# Patient Record
Sex: FEMALE | Race: WHITE | NOT HISPANIC OR LATINO | Employment: OTHER | URBAN - METROPOLITAN AREA
[De-identification: names, ages, dates, MRNs, and addresses within clinical notes are randomized per-mention and may not be internally consistent; named-entity substitution may affect disease eponyms.]

---

## 2017-02-10 ENCOUNTER — TRANSFERRED RECORDS (OUTPATIENT)
Dept: HEALTH INFORMATION MANAGEMENT | Facility: CLINIC | Age: 67
End: 2017-02-10

## 2017-02-12 DIAGNOSIS — N95.1 SYMPTOMATIC MENOPAUSAL OR FEMALE CLIMACTERIC STATES: Primary | ICD-10-CM

## 2017-02-14 ENCOUNTER — TRANSFERRED RECORDS (OUTPATIENT)
Dept: HEALTH INFORMATION MANAGEMENT | Facility: CLINIC | Age: 67
End: 2017-02-14

## 2017-02-15 ENCOUNTER — HOSPITAL ENCOUNTER (OUTPATIENT)
Dept: GENERAL RADIOLOGY | Facility: HOSPITAL | Age: 67
Discharge: HOME | End: 2017-02-15
Attending: NURSE PRACTITIONER | Admitting: NURSE PRACTITIONER
Payer: COMMERCIAL

## 2017-02-15 PROCEDURE — 77080 DXA BONE DENSITY AXIAL: CPT | Mod: TC

## 2018-02-13 ENCOUNTER — TRANSFERRED RECORDS (OUTPATIENT)
Dept: HEALTH INFORMATION MANAGEMENT | Facility: CLINIC | Age: 68
End: 2018-02-13

## 2018-02-13 LAB
CREAT SERPL-MCNC: 0.79 MG/DL (ref 0.4–1)
GFR SERPL CREATININE-BSD FRML MDRD: >60 ML/MIN/1.73M2
GLUCOSE SERPL-MCNC: 100 MG/DL (ref 70–100)
POTASSIUM SERPL-SCNC: 4.1 MEQ/L (ref 3.4–5.1)
TSH SERPL-ACNC: 3.67 UIU/ML (ref 0.4–3.99)

## 2019-04-24 NOTE — TELEPHONE ENCOUNTER
Synthroid  Last Written Prescription Date: Patient Reported  Last Fill Quantity: NA # of Refills: NA  Last Office Visit: 2/14/17    Atenolol  Last Written Prescription Date: Patient Reported  Last Fill Quantity: NA # of Refills: NA  Last Office Visit: 2/14/17    Trazodone  Last Written Prescription Date: Patient Reported  Last Fill Quantity: NA # of Refills: NA  Last Office Visit: 2/14/17    Protonix  Last Written Prescription Date: Patient Reported  Last Fill Quantity: NA # of Refills: NA  Last Office Visit: 2/14/17

## 2019-04-25 RX ORDER — LEVOTHYROXINE SODIUM 112 UG/1
112 TABLET ORAL DAILY
OUTPATIENT
Start: 2019-04-25

## 2019-04-25 RX ORDER — ATENOLOL 25 MG/1
25 TABLET ORAL DAILY
OUTPATIENT
Start: 2019-04-25

## 2019-04-25 RX ORDER — TRAZODONE HYDROCHLORIDE 50 MG/1
50 TABLET, FILM COATED ORAL AT BEDTIME
OUTPATIENT
Start: 2019-04-25

## 2019-04-25 RX ORDER — PANTOPRAZOLE SODIUM 40 MG/1
40 TABLET, DELAYED RELEASE ORAL
OUTPATIENT
Start: 2019-04-25

## 2019-05-01 RX ORDER — LEVOTHYROXINE SODIUM 112 UG/1
112 TABLET ORAL DAILY
OUTPATIENT
Start: 2019-05-01

## 2019-05-01 RX ORDER — PANTOPRAZOLE SODIUM 40 MG/1
40 TABLET, DELAYED RELEASE ORAL
OUTPATIENT
Start: 2019-05-01

## 2019-05-01 RX ORDER — ATENOLOL 25 MG/1
25 TABLET ORAL DAILY
OUTPATIENT
Start: 2019-05-01

## 2019-05-01 NOTE — TELEPHONE ENCOUNTER
Pantoprazole      Last Written Prescription Date:  Historical  Last Fill Quantity: 0,   # refills: 0  Last Office Visit: 2/14/17  Future Office visit:    Next 5 appointments (look out 90 days)    May 06, 2019  7:40 AM CDT  (Arrive by 7:25 AM)  Office Visit with Tiny Issa NP  [facility] ([facility] ) [address]  [place] [address]  [phone]           Routing refill request to provider for review/approval because:    Atenolol      Last Written Prescription Date:  Historical  Last Fill Quantity: 0,   # refills: 0  Last Office Visit: 2/14/17  Future Office visit:    Next 5 appointments (look out 90 days)    May 06, 2019  7:40 AM CDT  (Arrive by 7:25 AM)  Office Visit with Tiny Issa NP  [facility] ([facility] ) [address] [facility] [address]  [phone]           Routing refill request to provider for review/approval because:    Levothyroxine      Last Written Prescription Date:  Historical  Last Fill Quantity: 0,   # refills: 0  Last Office Visit: 2/14 /17  Future Office visit:    Next 5 appointments (look out 90 days)    May 06, 2019  7:40 AM CDT  (Arrive by 7:25 AM)  Office Visit with Tiny Issa NP  [facility] ([facility] ) [address]  [place] [place] [address]  [phone]           Routing refill request to provider for review/approval because:

## 2019-05-02 DIAGNOSIS — F51.01 PRIMARY INSOMNIA: Primary | ICD-10-CM

## 2019-05-02 NOTE — TELEPHONE ENCOUNTER
Amitriptyline      Last Written Prescription Date:  Historical  Last Fill Quantity: 0,   # refills: 0  Last Office Visit: 2/14/17  Future Office visit:    Next 5 appointments (look out 90 days)    May 06, 2019  7:40 AM CDT  (Arrive by 7:25 AM)  Office Visit with Tiny Issa NP  [facility] ([facility] ) [address]  [place] [address]  [phone]           Routing refill request to provider for review/approval because:

## 2019-05-02 NOTE — TELEPHONE ENCOUNTER
Are you willing to sign for medication since patient has appointment next week? Please advise.      Last visit: 2.10.17

## 2019-05-03 PROBLEM — Z78.0 POSTMENOPAUSAL STATUS: Status: ACTIVE | Noted: 2017-02-10

## 2019-05-03 PROBLEM — M85.80 OSTEOPENIA: Status: ACTIVE | Noted: 2017-02-10

## 2019-05-03 NOTE — PROGRESS NOTES
SUBJECTIVE:   Aide Galarza is a 68 year old female who presents for Preventive Visit.    Are you in the first 12 months of your Medicare coverage?  No    HPI  Do you feel safe in your environment? Yes    Do you have a Health Care Directive? Yes: Patient states has Advance Directive and will bring in a copy to clinic.      Fall risk  Fallen 2 or more times in the past year?: No  Any fall with injury in the past year?: No    Cognitive Screening   1) Repeat 3 items (Leader, Season, Table)    2) Clock draw: NORMAL  3) 3 item recall: Recalls 3 objects  Results: all correct    Mini-CogTM Copyright S Rosas. Licensed by the author for use in [employer]; reprinted with permission (merritt@[facility]). All rights reserved.      Do you have sleep apnea, excessive snoring or daytime drowsiness?: no     Functional dyspepsia. Switched to protonix with minimal relief and then amitriptyline was added and she had complete improvement. Would like to continue both. Consumes a healthy diet and notes that she is trying to increase her protein intake.       Hypothyroidism: Mild constipation, controlled. Otherwise denies skin/hair/nail changes, temperature intolerance, weight changes or heart palpitations. Taking Synthroid. TSH Due.      HTN: Not checking BP at home. Denies CP, palpitations, SOB, edema, vision changes, headaches. Currently taking atenolol. Denies any side effects. Watches sodium intake. Exercises daily.     Insomnia: Controlled with trazodone.     Reviewed and updated as needed this visit by clinical staff  Tobacco  Allergies  Meds  Med Hx  Surg Hx  Fam Hx  Soc Hx        Reviewed and updated as needed this visit by Provider        Social History     Tobacco Use     Smoking status: Never Smoker     Smokeless tobacco: Never Used   Substance Use Topics     Alcohol use: Yes     Comment: daily- wine      If you drink alcohol do you typically have >3 drinks per day or >7 drinks per week? no    Current providers  sharing in care for this patient include:   Patient Care Team:  Tiny Issa, NP as PCP - General    The following health maintenance items are reviewed in Epic and correct as of today:  Health Maintenance   Topic Date Due     TSH Q1 YEAR  07/19/1951     BMP Q1 YR  07/19/1951     HEPATITIS C SCREENING  07/19/1970     MAMMO SCREEN Q2 YR (SYSTEM ASSIGNED)  07/19/1992     LIPID SCREEN Q5 YR FEMALE (SYSTEM ASSIGNED)  07/19/1997     ZOSTER IMMUNIZATION (2 of 3) 12/16/2015     MEDICARE ANNUAL WELLNESS VISIT  07/19/2017     PNEUMOCOCCAL IMMUNIZATION 65+ LOW/MEDIUM RISK (1 of 2 - PCV13) 07/19/2017     FALL RISK ASSESSMENT  05/06/2020     ADVANCE DIRECTIVE PLANNING Q5 YRS  05/06/2024     COLONOSCOPY Q10 YR  09/30/2026     DTAP/TDAP/TD IMMUNIZATION (4 - Td) 02/13/2028     DEXA SCAN SCREENING (SYSTEM ASSIGNED)  Completed     PHQ-2  Completed     INFLUENZA VACCINE  Completed     IPV IMMUNIZATION  Aged Out     MENINGITIS IMMUNIZATION  Aged Out     BP Readings from Last 3 Encounters:   05/06/19 118/84   09/30/16 116/74   09/02/16 126/78    Wt Readings from Last 3 Encounters:   05/06/19 69.9 kg (154 lb)   09/30/16 67 kg (147 lb 12.8 oz)   09/02/16 67.6 kg (149 lb)                  Patient Active Problem List   Diagnosis     Encounter for general adult medical examination without abnormal findings     Essential hypertension     Gastroesophageal reflux disease     Hypothyroidism     Insomnia     Osteopenia     Postmenopausal status     H/O colonoscopy     Past Surgical History:   Procedure Laterality Date     caraitd Left 2011    partial facial disection with malignant tumor- [facility]      COLONOSCOPY N/A 9/30/2016    Procedure: COLONOSCOPY;  Surgeon: Gaurav Santana DO;  Location: [facility]     COLONOSCOPY      normal- every 10 years- [facility]        Social History     Tobacco Use     Smoking status: Never Smoker     Smokeless tobacco: Never Used   Substance Use Topics     Alcohol use: Yes     Comment: daily-  wine      Family History   Problem Relation Age of Onset     Hypertension Mother      Parkinsonism Father      Hypertension Father      Hypothyroidism Sister      Hypothyroidism Sister      Hypothyroidism Sister      Hypothyroidism Sister          Current Outpatient Medications   Medication Sig Dispense Refill     amitriptyline (ELAVIL) 25 MG tablet Take 1 tablet (25 mg) by mouth At Bedtime 90 tablet 3     atenolol (TENORMIN) 25 MG tablet Take 1 tablet (25 mg) by mouth daily 90 tablet 3     Calcium-Vitamin D 600-200 MG-UNIT TABS Take 1 tablet by mouth daily       Levothyroxine Sodium (SYNTHROID PO) Take 112 mcg by mouth daily       MULTIPLE VITAMINS PO Take 1 tablet by mouth daily       pantoprazole (PROTONIX) 40 MG EC tablet Take 1 tablet (40 mg) by mouth every morning (before breakfast) 90 tablet 3     traZODone (DESYREL) 50 MG tablet Take 1 tablet (50 mg) by mouth At Bedtime 90 tablet 3     No Known Allergies  Pneumonia Vaccine:Adults age 65+ who received their first dose of Pneumovax (PPSV23) prior to age 65 years: Should be given PCV 13 > 1 year after their most recent PPSV23 AND should be given a another dose of PPSV23 > 5 years after their most recent dose of PPSV23    Mammogram Screening: Mammogram Screening: Patient over age 50, mutual decision to screen reflected in health maintenance. She had her mammogram last year and it was negative. Will repeat every 2 years-will therefore do mammogram next year. She has no family history of breast cancer.     History of abnormal Pap smear: NO - age 67 - see link Cervical Cytology Screening Guidelines      Menopause age 56. Is on premarin cream as needed for vaginal dryness. No vaginal bleeding. Most recent pap smear was 1/23/15 and was negative. No history of abnormal paps. Patient denies breast changes, masses or discharge. Current contraceptive plans: post menopausal status. Will complete pap next year.     Review of Systems  CONSTITUTIONAL: NEGATIVE for fever,  "chills, change in weight  INTEGUMENTARY/SKIN: NEGATIVE for worrisome rashes, moles or lesions  EYES: NEGATIVE for vision changes or irritation  ENT/MOUTH: NEGATIVE for ear, mouth and throat problems  RESP: NEGATIVE for significant cough or SOB  BREAST: NEGATIVE for masses, tenderness or discharge  CV: NEGATIVE for chest pain, palpitations or peripheral edema  GI: NEGATIVE for nausea, abdominal pain, heartburn, or change in bowel habits  : NEGATIVE for frequency, dysuria, or hematuria  MUSCULOSKELETAL: NEGATIVE for significant arthralgias or myalgia  NEURO: NEGATIVE for weakness, dizziness or paresthesias  ENDOCRINE: NEGATIVE for temperature intolerance, skin/hair changes  HEME: NEGATIVE for bleeding problems  PSYCHIATRIC: NEGATIVE for changes in mood or affect    OBJECTIVE:   /84   Pulse 67   Temp 97.9  F (36.6  C) (Tympanic)   Ht 1.651 m (5' 5\")   Wt 69.9 kg (154 lb)   LMP  (LMP Unknown)   SpO2 99%   BMI 25.63 kg/m   Estimated body mass index is 25.63 kg/m  as calculated from the following:    Height as of this encounter: 1.651 m (5' 5\").    Weight as of this encounter: 69.9 kg (154 lb).  Physical Exam  GENERAL: healthy, alert and no distress  EYES: Eyes grossly normal to inspection, PERRL and conjunctivae and sclerae normal  HENT: ear canals and TM's normal, nose and mouth without ulcers or lesions  NECK: no adenopathy, no asymmetry, masses, or scars and thyroid normal to palpation  RESP: lungs clear to auscultation - no rales, rhonchi or wheezes  CV: regular rate and rhythm, normal S1 S2, no S3 or S4, no murmur  ABDOMEN:  soft, nontender, no HSM or masses and bowel sounds normal  MS: extremities normal- no gross deformities noted, no evidence of inflammation in joints, FROM in all extremities.  Lower Legs-no edema    Diagnostic Test Results:  Collected and pending    ASSESSMENT / PLAN:   (Z13.220) Lipid screening  (primary encounter diagnosis)  Plan: Lipid Profile (Chol, Trig, HDL, LDL calc)        " "Will notify patient of the results when available and intervene accordingly.     (I10) Essential hypertension  Plan: Basic metabolic panel, atenolol (TENORMIN) 25 MG tablet        BMP pending. BP well controlled. Medication refilled. F/u yearly. Encouraged to continue exercising and limiting her sodium intake.     (Z00.00) Health maintenance examination  Plan: Will update pna 13 vaccine. Colonoscopy UTD. Will do mammogram next year.     (K21.9) Gastroesophageal reflux disease, esophagitis presence not specified  Comment: controlled  Plan: pantoprazole (PROTONIX) 40 MG EC tablet        Continue pantoprazole and amitriptyline. DXA scan pending. If osteopenia has worsened, may consider decreasing pantoprazole.     (Z78.0) Asymptomatic postmenopausal estrogen deficiency  Comment: patient has osteopenia  Plan: DX Hip/Pelvis/Spine        Will notify patient of the results when available and intervene accordingly.     (E03.9) Hypothyroidism, unspecified type  Plan: TSH with free T4 reflex        Will notify patient of the results when available and intervene accordingly.     (F51.01) Primary insomnia  Comment: controlled.   Plan: amitriptyline (ELAVIL) 25 MG tablet, traZODone         (DESYREL) 50 MG tablet        Continue current medications.     (Z23) Encounter for immunization  Plan: PNEUMOCOCCAL CONJ VACCINE 13 VALENT IM      End of Life Planning:  Patient currently has an advanced directive: Yes.  Practitioner is supportive of decision.    COUNSELING:  Reviewed preventive health counseling, as reflected in patient instructions       Regular exercise       Healthy diet/nutrition       Vision screening       Dental care       Osteoporosis Prevention/Bone Health    BP Readings from Last 1 Encounters:   05/06/19 118/84     Estimated body mass index is 25.63 kg/m  as calculated from the following:    Height as of this encounter: 1.651 m (5' 5\").    Weight as of this encounter: 69.9 kg (154 lb).           reports that she " has never smoked. She has never used smokeless tobacco.      Appropriate preventive services were discussed with this patient, including applicable screening as appropriate for cardiovascular disease, diabetes, osteopenia/osteoporosis, and glaucoma.  As appropriate for age/gender, discussed screening for colorectal cancer, prostate cancer, breast cancer, and cervical cancer. Checklist reviewing preventive services available has been given to the patient.    Reviewed patients plan of care and provided an AVS. The Basic Care Plan (routine screening as documented in Health Maintenance) for Aide meets the Care Plan requirement. This Care Plan has been established and reviewed with the Patient.    Counseling Resources:  ATP IV Guidelines  Pooled Cohorts Equation Calculator  Breast Cancer Risk Calculator  FRAX Risk Assessment  ICSI Preventive Guidelines  Dietary Guidelines for Americans, 2010  USDA's MyPlate  ASA Prophylaxis  Lung CA Screening    Tiny Issa NP  [facility] [place]    Identified Health Risks:

## 2019-05-06 ENCOUNTER — OFFICE VISIT (OUTPATIENT)
Dept: FAMILY MEDICINE | Facility: OTHER | Age: 69
End: 2019-05-06
Attending: NURSE PRACTITIONER
Payer: COMMERCIAL

## 2019-05-06 VITALS
HEART RATE: 67 BPM | TEMPERATURE: 97.9 F | BODY MASS INDEX: 25.66 KG/M2 | OXYGEN SATURATION: 99 % | SYSTOLIC BLOOD PRESSURE: 118 MMHG | WEIGHT: 154 LBS | HEIGHT: 65 IN | DIASTOLIC BLOOD PRESSURE: 84 MMHG

## 2019-05-06 DIAGNOSIS — E03.9 HYPOTHYROIDISM, UNSPECIFIED TYPE: Primary | ICD-10-CM

## 2019-05-06 DIAGNOSIS — K21.9 GASTROESOPHAGEAL REFLUX DISEASE, ESOPHAGITIS PRESENCE NOT SPECIFIED: ICD-10-CM

## 2019-05-06 DIAGNOSIS — Z13.220 LIPID SCREENING: Primary | ICD-10-CM

## 2019-05-06 DIAGNOSIS — Z78.0 ASYMPTOMATIC POSTMENOPAUSAL ESTROGEN DEFICIENCY: ICD-10-CM

## 2019-05-06 DIAGNOSIS — I10 ESSENTIAL HYPERTENSION: ICD-10-CM

## 2019-05-06 DIAGNOSIS — E03.9 HYPOTHYROIDISM, UNSPECIFIED TYPE: ICD-10-CM

## 2019-05-06 DIAGNOSIS — Z00.00 HEALTH MAINTENANCE EXAMINATION: ICD-10-CM

## 2019-05-06 DIAGNOSIS — F51.01 PRIMARY INSOMNIA: ICD-10-CM

## 2019-05-06 DIAGNOSIS — Z23 ENCOUNTER FOR IMMUNIZATION: ICD-10-CM

## 2019-05-06 PROBLEM — Z91.89 FRAMINGHAM CARDIAC RISK <10% IN NEXT 10 YEARS: Status: ACTIVE | Noted: 2019-05-06

## 2019-05-06 PROBLEM — Z98.890 H/O COLONOSCOPY: Status: ACTIVE | Noted: 2019-05-06

## 2019-05-06 LAB
ANION GAP SERPL CALCULATED.3IONS-SCNC: 3 MMOL/L (ref 3–14)
BUN SERPL-MCNC: 16 MG/DL (ref 7–30)
CALCIUM SERPL-MCNC: 9.2 MG/DL (ref 8.5–10.1)
CHLORIDE SERPL-SCNC: 106 MMOL/L (ref 94–109)
CHOLEST SERPL-MCNC: 204 MG/DL
CO2 SERPL-SCNC: 30 MMOL/L (ref 20–32)
CREAT SERPL-MCNC: 0.72 MG/DL (ref 0.52–1.04)
GFR SERPL CREATININE-BSD FRML MDRD: 86 ML/MIN/{1.73_M2}
GLUCOSE SERPL-MCNC: 95 MG/DL (ref 70–99)
HDLC SERPL-MCNC: 73 MG/DL
LDLC SERPL CALC-MCNC: 115 MG/DL
NONHDLC SERPL-MCNC: 131 MG/DL
POTASSIUM SERPL-SCNC: 4.2 MMOL/L (ref 3.4–5.3)
SODIUM SERPL-SCNC: 139 MMOL/L (ref 133–144)
TRIGL SERPL-MCNC: 80 MG/DL
TSH SERPL DL<=0.005 MIU/L-ACNC: 1.02 MU/L (ref 0.4–4)

## 2019-05-06 PROCEDURE — 84443 ASSAY THYROID STIM HORMONE: CPT | Mod: ZL | Performed by: NURSE PRACTITIONER

## 2019-05-06 PROCEDURE — 80061 LIPID PANEL: CPT | Mod: ZL | Performed by: NURSE PRACTITIONER

## 2019-05-06 PROCEDURE — G0009 ADMIN PNEUMOCOCCAL VACCINE: HCPCS | Performed by: NURSE PRACTITIONER

## 2019-05-06 PROCEDURE — 90670 PCV13 VACCINE IM: CPT

## 2019-05-06 PROCEDURE — 36415 COLL VENOUS BLD VENIPUNCTURE: CPT | Mod: ZL | Performed by: NURSE PRACTITIONER

## 2019-05-06 PROCEDURE — G0463 HOSPITAL OUTPT CLINIC VISIT: HCPCS | Mod: 25

## 2019-05-06 PROCEDURE — 99397 PER PM REEVAL EST PAT 65+ YR: CPT | Performed by: NURSE PRACTITIONER

## 2019-05-06 PROCEDURE — 80048 BASIC METABOLIC PNL TOTAL CA: CPT | Mod: ZL | Performed by: NURSE PRACTITIONER

## 2019-05-06 RX ORDER — TRAZODONE HYDROCHLORIDE 50 MG/1
50 TABLET, FILM COATED ORAL AT BEDTIME
Qty: 90 TABLET | Refills: 3 | Status: SHIPPED | OUTPATIENT
Start: 2019-05-06 | End: 2020-04-20

## 2019-05-06 RX ORDER — LEVOTHYROXINE SODIUM 112 UG/1
112 TABLET ORAL DAILY
Qty: 90 TABLET | Refills: 3 | Status: SHIPPED | OUTPATIENT
Start: 2019-05-06 | End: 2020-04-20

## 2019-05-06 RX ORDER — ATENOLOL 25 MG/1
25 TABLET ORAL DAILY
Qty: 90 TABLET | Refills: 3 | Status: SHIPPED | OUTPATIENT
Start: 2019-05-06 | End: 2020-04-20

## 2019-05-06 RX ORDER — PANTOPRAZOLE SODIUM 40 MG/1
40 TABLET, DELAYED RELEASE ORAL
Qty: 90 TABLET | Refills: 3 | Status: SHIPPED | OUTPATIENT
Start: 2019-05-06 | End: 2020-04-20

## 2019-05-06 ASSESSMENT — ANXIETY QUESTIONNAIRES
GAD7 TOTAL SCORE: 0
7. FEELING AFRAID AS IF SOMETHING AWFUL MIGHT HAPPEN: NOT AT ALL
3. WORRYING TOO MUCH ABOUT DIFFERENT THINGS: NOT AT ALL
4. TROUBLE RELAXING: NOT AT ALL
6. BECOMING EASILY ANNOYED OR IRRITABLE: NOT AT ALL
1. FEELING NERVOUS, ANXIOUS, OR ON EDGE: NOT AT ALL
5. BEING SO RESTLESS THAT IT IS HARD TO SIT STILL: NOT AT ALL
2. NOT BEING ABLE TO STOP OR CONTROL WORRYING: NOT AT ALL

## 2019-05-06 ASSESSMENT — PAIN SCALES - GENERAL: PAINLEVEL: NO PAIN (0)

## 2019-05-06 ASSESSMENT — PATIENT HEALTH QUESTIONNAIRE - PHQ9: SUM OF ALL RESPONSES TO PHQ QUESTIONS 1-9: 0

## 2019-05-06 ASSESSMENT — MIFFLIN-ST. JEOR: SCORE: 1239.42

## 2019-05-06 NOTE — NURSING NOTE
"Chief Complaint   Patient presents with     Establish Care     Physical       Initial /84   Pulse 67   Temp 97.9  F (36.6  C) (Tympanic)   Ht 1.651 m (5' 5\")   Wt 69.9 kg (154 lb)   LMP  (LMP Unknown)   SpO2 99%   BMI 25.63 kg/m   Estimated body mass index is 25.63 kg/m  as calculated from the following:    Height as of this encounter: 1.651 m (5' 5\").    Weight as of this encounter: 69.9 kg (154 lb).  Medication Reconciliation: complete    Tanesha Alfaro LPN  "

## 2019-05-06 NOTE — NURSING NOTE
Prior to injection, verified patient identity using patient's name and date of birth.   Tanesha Alfaro LPN

## 2019-05-07 ASSESSMENT — ANXIETY QUESTIONNAIRES: GAD7 TOTAL SCORE: 0

## 2019-05-16 ENCOUNTER — TRANSFERRED RECORDS (OUTPATIENT)
Dept: HEALTH INFORMATION MANAGEMENT | Facility: CLINIC | Age: 69
End: 2019-05-16

## 2019-05-23 ENCOUNTER — HOSPITAL ENCOUNTER (OUTPATIENT)
Dept: BONE DENSITY | Facility: HOSPITAL | Age: 69
Discharge: HOME | End: 2019-05-23
Attending: NURSE PRACTITIONER | Admitting: NURSE PRACTITIONER
Payer: MEDICARE

## 2019-05-23 DIAGNOSIS — Z78.0 ASYMPTOMATIC POSTMENOPAUSAL ESTROGEN DEFICIENCY: ICD-10-CM

## 2019-05-23 PROCEDURE — 77080 DXA BONE DENSITY AXIAL: CPT | Mod: TC

## 2019-09-10 NOTE — PROGRESS NOTES
Subjective     Aide Galarza is a 69 year old female who presents to clinic today for the following health issues:    HPI   Musculoskeletal problem/pain      Duration: Aug 30th    Description  Location: stiff  and sore when walking and not as painful when walking and fully standing    Intensity:  mild    Accompanying signs and symptoms: dull ache    History  Previous similar problem: no   Previous evaluation:  none    Precipitating or alleviating factors:  Trauma or overuse: no   Aggravating factors include: none    Therapies tried and outcome: heat, massage and Ibuprofen      # Left knee pain  - August 30th: doing household work, left knee started to become stiff and sore mid morning.  - next day, pain with walking  - pain with going up and down stairs  - no swelling   - knee brace helps  - tried ice/heat, w/ benefit from heat  - tried IBU (2) x1, ASA x2 w/o relief  - end of day feels like it might give out  - no feeling of locking up  - no n/t  - no tic bites  - first episode of knee issues    Patient Active Problem List   Diagnosis     Encounter for general adult medical examination without abnormal findings     Essential hypertension     Gastroesophageal reflux disease     Hypothyroidism     Insomnia     Osteopenia     Postmenopausal status     H/O colonoscopy     Parmelee cardiac risk 6% in next 10 years     Past Surgical History:   Procedure Laterality Date     caraitd Left 2011    partial facial disection with malignant tumor- [facility]      COLONOSCOPY N/A 9/30/2016    Procedure: COLONOSCOPY;  Surgeon: Gaurav Santana DO;  Location: [facility]     COLONOSCOPY      normal- every 10 years- [facility]        Social History     Tobacco Use     Smoking status: Never Smoker     Smokeless tobacco: Never Used   Substance Use Topics     Alcohol use: Yes     Comment: daily- wine      Family History   Problem Relation Age of Onset     Hypertension Mother      Parkinsonism Father      Hypertension  Father      Hypothyroidism Sister      Hypothyroidism Sister      Hypothyroidism Sister      Hypothyroidism Sister          Current Outpatient Medications   Medication Sig Dispense Refill     amitriptyline (ELAVIL) 25 MG tablet Take 1 tablet (25 mg) by mouth At Bedtime 90 tablet 3     atenolol (TENORMIN) 25 MG tablet Take 1 tablet (25 mg) by mouth daily 90 tablet 3     Calcium-Vitamin D 600-200 MG-UNIT TABS Take 1 tablet by mouth daily       levothyroxine (SYNTHROID) 112 MCG tablet Take 1 tablet (112 mcg) by mouth daily 90 tablet 3     MULTIPLE VITAMINS PO Take 1 tablet by mouth daily       pantoprazole (PROTONIX) 40 MG EC tablet Take 1 tablet (40 mg) by mouth every morning (before breakfast) 90 tablet 3     traZODone (DESYREL) 50 MG tablet Take 1 tablet (50 mg) by mouth At Bedtime 90 tablet 3     No Known Allergies  BP Readings from Last 3 Encounters:   09/11/19 139/79   05/06/19 118/84   09/30/16 116/74    Wt Readings from Last 3 Encounters:   09/11/19 71.9 kg (158 lb 9.6 oz)   05/06/19 69.9 kg (154 lb)   09/30/16 67 kg (147 lb 12.8 oz)                    Reviewed and updated as needed this visit by Provider  Tobacco  Allergies  Med Hx  Surg Hx  Fam Hx  Soc Hx        Review of Systems   Constitutional: Negative for chills and fever.   HENT: Negative for congestion, ear pain, rhinorrhea and sore throat.    Respiratory: Negative for cough and shortness of breath.    Cardiovascular: Negative for chest pain and palpitations.   Gastrointestinal: Negative for abdominal pain, constipation, diarrhea, hematochezia, nausea and vomiting.   Genitourinary: Negative for dysuria, frequency and hematuria.   Musculoskeletal: Positive for arthralgias. Negative for joint swelling and myalgias.   Skin: Negative for rash.   Neurological: Negative for dizziness, light-headedness and headaches.            Objective    /79 (BP Location: Left arm, Patient Position: Sitting, Cuff Size: Adult Regular)   Pulse 70   Temp 97.8  F  "(36.6  C) (Tympanic)   Resp 16   Wt 71.9 kg (158 lb 9.6 oz)   LMP  (LMP Unknown)   SpO2 98%   BMI 26.39 kg/m    Body mass index is 26.39 kg/m .  Physical Exam   Constitutional: She appears well-developed and well-nourished. No distress.   Cardiovascular: Normal rate, regular rhythm, normal heart sounds and intact distal pulses.   No murmur heard.  Pulmonary/Chest: Effort normal and breath sounds normal. No respiratory distress. She has no wheezes.   Abdominal: Soft. Bowel sounds are normal. She exhibits no distension.   Musculoskeletal: She exhibits no edema.   Examination of Bilateral knees:  - Limp: None  - Deformity: None   - Effusion: None  - Crepitus: creptius on right, none on left  - Medial joint line tenderness: None   - Lateral joint line tenderness: none  - Patellar grind: negative  - Ecchymosis: None  Stability Testing of Bilateral knee:   - Mila: negative on right, positive on left on inferior to patella  - Anterior Lachman: negative  - Posterior drawer: Negative  - Posterior sag: None  - MCL: None  - LCL: none  Range of motion: full  Strength: Full, but slight decrease on left    Left knee: TTP over patellar tendon and lateral to the tendon      Diagnostic Test Results:  none         Assessment & Plan     (M25.562) Acute pain of left knee  (primary encounter diagnosis)  Comment: Most likely patellofemoral syndrome with pain walking up and down stairs and pain located on patellar tendon. Mila was positive, but most likely d/t inflammation of patellar tendon  Plan: PHYSICAL THERAPY REFERRAL            -800mg tid x5 w/ food            Ice/heat/compression       BMI:   Estimated body mass index is 25.63 kg/m  as calculated from the following:    Height as of 5/6/19: 1.651 m (5' 5\").    Weight as of 5/6/19: 69.9 kg (154 lb).   Weight management plan: deferred        See Patient Instructions    Return if symptoms worsen or fail to improve.    Aisha Juarez MD  [facility] " [facility] [place]

## 2019-09-11 ENCOUNTER — OFFICE VISIT (OUTPATIENT)
Dept: FAMILY MEDICINE | Facility: OTHER | Age: 69
End: 2019-09-11
Attending: NURSE PRACTITIONER
Payer: COMMERCIAL

## 2019-09-11 VITALS
RESPIRATION RATE: 16 BRPM | SYSTOLIC BLOOD PRESSURE: 139 MMHG | OXYGEN SATURATION: 98 % | HEART RATE: 70 BPM | WEIGHT: 158.6 LBS | TEMPERATURE: 97.8 F | DIASTOLIC BLOOD PRESSURE: 79 MMHG | BODY MASS INDEX: 26.39 KG/M2

## 2019-09-11 DIAGNOSIS — M25.562 ACUTE PAIN OF LEFT KNEE: Primary | ICD-10-CM

## 2019-09-11 PROCEDURE — 99213 OFFICE O/P EST LOW 20 MIN: CPT | Performed by: FAMILY MEDICINE

## 2019-09-11 PROCEDURE — G0463 HOSPITAL OUTPT CLINIC VISIT: HCPCS

## 2019-09-11 ASSESSMENT — ENCOUNTER SYMPTOMS
LIGHT-HEADEDNESS: 0
CONSTIPATION: 0
HEMATURIA: 0
JOINT SWELLING: 0
DIZZINESS: 0
VOMITING: 0
SHORTNESS OF BREATH: 0
HEADACHES: 0
FEVER: 0
ARTHRALGIAS: 1
SORE THROAT: 0
CHILLS: 0
HEMATOCHEZIA: 0
FREQUENCY: 0
COUGH: 0
DYSURIA: 0
DIARRHEA: 0
RHINORRHEA: 0
ABDOMINAL PAIN: 0
NAUSEA: 0
MYALGIAS: 0
PALPITATIONS: 0

## 2019-09-11 ASSESSMENT — PAIN SCALES - GENERAL: PAINLEVEL: MILD PAIN (2)

## 2019-09-11 NOTE — NURSING NOTE
"Chief Complaint   Patient presents with     Musculoskeletal Problem       Initial /79 (BP Location: Left arm, Patient Position: Sitting, Cuff Size: Adult Regular)   Pulse 70   Temp 97.8  F (36.6  C) (Tympanic)   Resp 16   Wt 71.9 kg (158 lb 9.6 oz)   LMP  (LMP Unknown)   SpO2 98%   BMI 26.39 kg/m   Estimated body mass index is 26.39 kg/m  as calculated from the following:    Height as of 5/6/19: 1.651 m (5' 5\").    Weight as of this encounter: 71.9 kg (158 lb 9.6 oz).  Medication Reconciliation: Stacey Martin LPN  "

## 2019-09-11 NOTE — PATIENT INSTRUCTIONS
Take ibuprofen 600-800mg three time per day for the 5 days with food  Use heat/ice  Compression / knee brace for comfort  Return to clinic if your symptoms get worse, or does not resolve in 6 weeks.       Referral to physical therapyPatient Education     Understanding Patellofemoral Syndrome    Patellofemoral syndrome is a condition that causes pain on the front of the knee. The large bones of the upper and lower leg meet at the knee. This joint also includes a small triangle-shaped bone that rests on top of the leg bones. This is the kneecap (patella). Patellofemoral refers to the patella and the thigh bone (femur). These bones are surrounded by connective tissue and muscles. Patellofemoral pain is believed to come from stress on the tissues of and around the knee.  What causes patellofemoral syndrome?  No single cause for patellofemoral pain has been found. But many things are likely to contribute to this type of knee pain. These include:    Actions that put repeated stress on the knee, such as running and squatting    Overtraining at a sport    Weak hip or thigh muscle    Normal variations in the way body parts fit together    Poor form during activities that stress the knee, such as running    A fall or blow to the knee  Symptoms of patellofemoral syndrome  Pain is a common symptom. It s often on the front of the knee, but can be around the kneecap. Pain can occur at certain times, such as when you are:    Running    Sitting for a long time with your knees bent, such as at a movie    Walking up or down stairs    Squatting  Other symptoms may include:    A feeling of the knee catching or locking    A grinding or crackling noise in your knee  Treatment for patellofemoral syndrome  Treatment focuses on reducing pain and avoiding further injury. Treatments may include:    Rest your leg. This gives your knee time to recover. You may need to avoid or change the activity that caused the problem, such as not running for  a while.    Prescription or over-the-counter pain medicines. These help reduce inflammation, swelling, and pain.    Cold packs. These help reduce pain.    Stretches and other exercises. These can improve balance, flexibility, and strength.    A shoe insert (orthotic). This can make your knee more stable.    Elastic tape or a brace. These can make your knee more stable.    Physical therapy. This may include exercises or other treatments.    Surgery. In rare cases, if other treatments don t relieve symptoms, you may need surgery.  Possible complications of patellofemoral syndrome  If you don t give your knee time to heal, symptoms may return or get worse. Follow your healthcare provider s instructions on resting and treating your knee.  When to call your healthcare provider  Call your healthcare provider right away if you have any of these:    Fever of 100.4 F (38 C) or higher, or as directed    Pain that gets worse    Symptoms that don t get better, or get worse    New symptoms   Date Last Reviewed: 3/10/2016    9108-9548 [employer]. [address] [place] [address], [place] [address]. All rights reserved. This information is not intended as a substitute for professional medical care. Always follow your healthcare professional's instructions.

## 2019-09-19 ENCOUNTER — HOSPITAL ENCOUNTER (OUTPATIENT)
Dept: PHYSICAL THERAPY | Facility: HOSPITAL | Age: 69
Setting detail: THERAPIES SERIES
End: 2019-09-19
Attending: FAMILY MEDICINE
Payer: MEDICARE

## 2019-09-19 DIAGNOSIS — M25.562 ACUTE PAIN OF LEFT KNEE: ICD-10-CM

## 2019-09-19 PROCEDURE — 97110 THERAPEUTIC EXERCISES: CPT | Mod: GP

## 2019-09-19 PROCEDURE — 97161 PT EVAL LOW COMPLEX 20 MIN: CPT | Mod: GP

## 2019-09-19 ASSESSMENT — ACTIVITIES OF DAILY LIVING (ADL)
GO DOWN STAIRS: ACTIVITY IS SOMEWHAT DIFFICULT
WALK: ACTIVITY IS MINIMALLY DIFFICULT
STIFFNESS: THE SYMPTOM AFFECTS MY ACTIVITY MODERATELY
PAIN: THE SYMPTOM AFFECTS MY ACTIVITY MODERATELY
KNEE_ACTIVITY_OF_DAILY_LIVING_SCORE: 65.71
HOW_WOULD_YOU_RATE_THE_OVERALL_FUNCTION_OF_YOUR_KNEE_DURING_YOUR_USUAL_DAILY_ACTIVITIES?: ABNORMAL
HOW_WOULD_YOU_RATE_THE_CURRENT_FUNCTION_OF_YOUR_KNEE_DURING_YOUR_USUAL_DAILY_ACTIVITIES_ON_A_SCALE_FROM_0_TO_100_WITH_100_BEING_YOUR_LEVEL_OF_KNEE_FUNCTION_PRIOR_TO_YOUR_INJURY_AND_0_BEING_THE_INABILITY_TO_PERFORM_ANY_OF_YOUR_USUAL_DAILY_ACTIVITIES?: 75
GIVING WAY, BUCKLING OR SHIFTING OF KNEE: I DO NOT HAVE THE SYMPTOM
RAW_SCORE: 46
KNEEL ON THE FRONT OF YOUR KNEE: ACTIVITY IS NOT DIFFICULT
SIT WITH YOUR KNEE BENT: ACTIVITY IS SOMEWHAT DIFFICULT
GO UP STAIRS: ACTIVITY IS VERY DIFFICULT
LIMPING: THE SYMPTOM AFFECTS MY ACTIVITY MODERATELY
AS_A_RESULT_OF_YOUR_KNEE_INJURY,_HOW_WOULD_YOU_RATE_YOUR_CURRENT_LEVEL_OF_DAILY_ACTIVITY?: ABNORMAL
SQUAT: ACTIVITY IS SOMEWHAT DIFFICULT
STAND: ACTIVITY IS MINIMALLY DIFFICULT
KNEE_ACTIVITY_OF_DAILY_LIVING_SUM: 46
WEAKNESS: I DO NOT HAVE THE SYMPTOM
SWELLING: I DO NOT HAVE THE SYMPTOM
RISE FROM A CHAIR: ACTIVITY IS FAIRLY DIFFICULT

## 2019-09-19 NOTE — PROGRESS NOTES
09/19/19 0800   General Information   Type of Visit Initial OP Ortho PT Evaluation   Start of Care Date 09/19/19   Referring Physician Dr Juarez   Orders Evaluate and Treat   Orders Comment L patellofemoral syndrome   Date of Order 09/11/19   Certification Required? Yes   Medical Diagnosis L knee pain, patellofemoral syndrome   Surgical/Medical history reviewed Yes   Precautions/Limitations no known precautions/limitations   General Information Comments Knee Outcome survey 65.71%   Body Part(s)   Body Part(s) Knee   Presentation and Etiology   Pertinent history of current problem (include personal factors and/or comorbidities that impact the POC) This 70 y/o female had sudden insidious onset of L knee pain, stiffness- on 8/30/19. Occas has a tingling sensation once daily when sitting still.  Scrubs fllor on knees without problem. Most difficulty getting up/down chair, off floor, descending steps. Ibuprofen helped for 5 day stretch. Typically walks one mile per day.  Does not have hx of knee pain-    Impairments A. Pain;B. Decreased WB tolerance;D. Decreased ROM;E. Decreased flexibility;G. Impaired balance;F. Decreased strength and endurance;H. Impaired gait   Functional Limitations perform desired leisure / sports activities;perform activities of daily living   Symptom Location L knee    How/Where did it occur From insidious onset   Onset date of current episode/exacerbation 08/30/19   Chronicity New   Pain rating (0-10 point scale) Best (/10);Worst (/10)   Best (/10) 2   Worst (/10) 7   Pain quality C. Aching;B. Dull  (stiffness)   Frequency of pain/symptoms B. Intermittent   Pain/symptoms are: Worse during the day   Pain/symptoms exacerbated by B. Walking;G. Certain positions;K. Home tasks  (stair descending, mostly during weightbearing)   Pain/symptoms eased by G. Heat;J. Braces/supports   Progression of symptoms since onset: Improved   Current Level of Function   Current Community Support Family/friend  caregiver   Patient role/employment history F. Retired  (does work once weekly)   [facility]   Home/community accessibility lives in cabin and house. No steps in house, many steps in cabin   Current equipment-Gait/Locomotion None   Fall Risk Screen   Have you fallen 2 or more times in the past year? No   Have you fallen and had an injury in the past year? No   Is patient a fall risk? No   Knee Objective Findings   Side (if bilateral, select both right and left) Left   Observation sl swelling over tibial tubercle   Integumentary  intact   Gait/Locomotion no signif gait antalgia   Lachmans Test neg   Anterior Drawer Test neg   Posterior Drawer Test neg   Varus Stress Test neg  (signif laxity)   Valgus Stress Test neg  (signif ligament laxity)   Mila's Test +    Palpation Lateral joint line tenderness, Patellar tendon tenderness, Tibial tubercle tenderness and swelling   Accessory Motion/Joint Mobility Pt will benefit from strengthening, stretching of quad and hip to enable reduced patello femoral pain and tendonitis symptoms.    Left Knee Extension AROM 0   Left Knee Extension PROM 0   Left Knee Flexion AROM full   Left Knee Flexion PROM full   Left Knee Flexion Strength 4-   Left Knee Extension Strength 3+   Left Hip Abduction Strength 3+   Left Quad Set Strength 3+   L VMO Strength 3+   Left Gastrocnemius Flexibility wnl   Left Hamstring Flexibility wnl   Left Hip Flexor Flexibility tight   Left Quadricep Flexibility tight   Left ITB Flexibility tight   Planned Therapy Interventions   Planned Therapy Interventions neuromuscular re-education;strengthening;stretching   Planned Therapy Interventions Comment HEP/education.    Planned Modality Interventions   Planned Modality Interventions Comments prn   Clinical Impression   Criteria for Skilled Therapeutic Interventions Met yes, treatment indicated   PT Diagnosis L Knee pain d/t patellofemoral syndrome, quad weakness and tightness    Influenced by the following impairments pain, tightness, weakness, swelling   Functional limitations due to impairments steps, walking, leisure, transfers   Clinical Presentation Evolving/Changing   Clinical Decision Making (Complexity) Moderate complexity   Therapy Frequency 2 times/Week   Predicted Duration of Therapy Intervention (days/wks) 12 weeks   Risk & Benefits of therapy have been explained Yes   Patient, Family & other staff in agreement with plan of care Yes   Education Assessment   Barriers to Learning No barriers   ORTHO GOALS   PT Ortho Eval Goals 1;2;3   Ortho Goal 1   Goal Identifier STG1   Goal Description Pt will demonstrate indep HEP compliance   Target Date 10/10/19   Ortho Goal 2   Goal Identifier STG 2   Goal Description Pt will have reduced pain to 4/10 or less with stair descent and sit/stand transfer   Target Date 10/31/19   Ortho Goal 3   Goal Identifier LTG   Goal Description Pt will resume walking, transfers and stair painfree with 5/5 grade strength LLE   Target Date 12/12/19   Total Evaluation Time   PT Eval, Low Complexity Minutes (91548) 30   Therapy Certification   Certification date from 09/19/19   Certification date to 12/12/19   Medical Diagnosis L knee pain, patellofemoral syndrome

## 2019-09-26 ENCOUNTER — HOSPITAL ENCOUNTER (OUTPATIENT)
Dept: PHYSICAL THERAPY | Facility: HOSPITAL | Age: 69
Setting detail: THERAPIES SERIES
End: 2019-09-26
Attending: FAMILY MEDICINE
Payer: MEDICARE

## 2019-09-26 PROCEDURE — 97110 THERAPEUTIC EXERCISES: CPT | Mod: GP

## 2019-10-03 ENCOUNTER — HOSPITAL ENCOUNTER (OUTPATIENT)
Dept: PHYSICAL THERAPY | Facility: HOSPITAL | Age: 69
Setting detail: THERAPIES SERIES
End: 2019-10-03
Attending: FAMILY MEDICINE
Payer: MEDICARE

## 2019-10-03 PROCEDURE — 97110 THERAPEUTIC EXERCISES: CPT | Mod: GP

## 2019-10-10 ENCOUNTER — HOSPITAL ENCOUNTER (OUTPATIENT)
Dept: PHYSICAL THERAPY | Facility: HOSPITAL | Age: 69
Setting detail: THERAPIES SERIES
End: 2019-10-10
Attending: FAMILY MEDICINE
Payer: MEDICARE

## 2019-10-10 PROCEDURE — 97110 THERAPEUTIC EXERCISES: CPT | Mod: GP

## 2019-10-17 ENCOUNTER — HOSPITAL ENCOUNTER (OUTPATIENT)
Dept: PHYSICAL THERAPY | Facility: HOSPITAL | Age: 69
Setting detail: THERAPIES SERIES
End: 2019-10-17
Attending: FAMILY MEDICINE
Payer: MEDICARE

## 2019-10-17 PROCEDURE — 97110 THERAPEUTIC EXERCISES: CPT | Mod: GP

## 2019-10-24 ENCOUNTER — HOSPITAL ENCOUNTER (OUTPATIENT)
Dept: PHYSICAL THERAPY | Facility: HOSPITAL | Age: 69
Setting detail: THERAPIES SERIES
End: 2019-10-24
Attending: FAMILY MEDICINE
Payer: MEDICARE

## 2019-10-24 PROCEDURE — 97110 THERAPEUTIC EXERCISES: CPT | Mod: GP

## 2019-11-07 ENCOUNTER — HOSPITAL ENCOUNTER (OUTPATIENT)
Dept: PHYSICAL THERAPY | Facility: HOSPITAL | Age: 69
Setting detail: THERAPIES SERIES
End: 2019-11-07
Attending: FAMILY MEDICINE
Payer: MEDICARE

## 2019-11-07 PROCEDURE — 97110 THERAPEUTIC EXERCISES: CPT | Mod: GP

## 2019-11-07 ASSESSMENT — ACTIVITIES OF DAILY LIVING (ADL)
AS_A_RESULT_OF_YOUR_KNEE_INJURY,_HOW_WOULD_YOU_RATE_YOUR_CURRENT_LEVEL_OF_DAILY_ACTIVITY?: NEARLY NORMAL
KNEE_ACTIVITY_OF_DAILY_LIVING_SUM: 59
HOW_WOULD_YOU_RATE_THE_OVERALL_FUNCTION_OF_YOUR_KNEE_DURING_YOUR_USUAL_DAILY_ACTIVITIES?: NEARLY NORMAL
SIT WITH YOUR KNEE BENT: ACTIVITY IS NOT DIFFICULT
SQUAT: NOT ANSWERED
GO DOWN STAIRS: ACTIVITY IS MINIMALLY DIFFICULT
KNEE_ACTIVITY_OF_DAILY_LIVING_SCORE: 90.77
RISE FROM A CHAIR: ACTIVITY IS MINIMALLY DIFFICULT
GO UP STAIRS: ACTIVITY IS MINIMALLY DIFFICULT
LIMPING: I DO NOT HAVE THE SYMPTOM
KNEEL ON THE FRONT OF YOUR KNEE: ACTIVITY IS NOT DIFFICULT
SWELLING: I HAVE THE SYMPTOM BUT IT DOES NOT AFFECT MY ACTIVITY
WEAKNESS: I DO NOT HAVE THE SYMPTOM
RAW_SCORE: 63.54
PAIN: I HAVE THE SYMPTOM BUT IT DOES NOT AFFECT MY ACTIVITY
STAND: ACTIVITY IS NOT DIFFICULT
GIVING WAY, BUCKLING OR SHIFTING OF KNEE: I DO NOT HAVE THE SYMPTOM
HOW_WOULD_YOU_RATE_THE_CURRENT_FUNCTION_OF_YOUR_KNEE_DURING_YOUR_USUAL_DAILY_ACTIVITIES_ON_A_SCALE_FROM_0_TO_100_WITH_100_BEING_YOUR_LEVEL_OF_KNEE_FUNCTION_PRIOR_TO_YOUR_INJURY_AND_0_BEING_THE_INABILITY_TO_PERFORM_ANY_OF_YOUR_USUAL_DAILY_ACTIVITIES?: 95
WALK: ACTIVITY IS NOT DIFFICULT
STIFFNESS: I HAVE THE SYMPTOM BUT IT DOES NOT AFFECT MY ACTIVITY

## 2020-03-02 ENCOUNTER — HEALTH MAINTENANCE LETTER (OUTPATIENT)
Age: 70
End: 2020-03-02

## 2020-03-26 NOTE — PROGRESS NOTES
Outpatient Physical Therapy Discharge Note     Patient: Aide Galarza  : 1950    Beginning/End Dates of Reporting Period:  2019-2019    Referring Provider: Dr Juarez    Therapy Diagnosis: L patellofemoral syndrome     Client Self Report: See catherine    Objective Measurements:  Objective Measure: Knee Outcome Survey  Details: initial :65.71%, improved to 90.77%                                Goals:  Goal Identifier STG1   Goal Description Pt will demonstrate indep HEP compliance   Target Date 10/10/19   Date Met   2019   Progress:     Goal Identifier STG 2   Goal Description Pt will have reduced pain to 4/10 or less with stair descent and sit/stand transfer   Target Date 10/31/19   Date Met   2019   Progress:     Goal Identifier LTG   Goal Description Pt will resume walking, transfers and stair painfree with 5/5 grade strength LLE   Target Date 19   Date Met   2019   Progress:     Goal Identifier     Goal Description     Target Date     Date Met      Progress:     Goal Identifier     Goal Description     Target Date     Date Met      Progress:     Goal Identifier     Goal Description     Target Date     Date Met      Progress:     Goal Identifier     Goal Description     Target Date     Date Met      Progress:     Goal Identifier     Goal Description     Target Date     Date Met      Progress:     Progress Toward Goals:   Progress this reporting period: This outpatient has beeen seen x 7 visits total. All goals met as of last visit.           Plan:  Discharge from therapy.    Discharge:    Reason for Discharge: Patient has met all goals.    Equipment Issued: n/a    Discharge Plan: Patient to continue home program.

## 2020-04-18 DIAGNOSIS — I10 ESSENTIAL HYPERTENSION: ICD-10-CM

## 2020-04-18 DIAGNOSIS — E03.9 HYPOTHYROIDISM, UNSPECIFIED TYPE: ICD-10-CM

## 2020-04-18 DIAGNOSIS — K21.9 GASTROESOPHAGEAL REFLUX DISEASE, ESOPHAGITIS PRESENCE NOT SPECIFIED: ICD-10-CM

## 2020-04-18 DIAGNOSIS — F51.01 PRIMARY INSOMNIA: ICD-10-CM

## 2020-04-20 RX ORDER — TRAZODONE HYDROCHLORIDE 50 MG/1
TABLET, FILM COATED ORAL
Qty: 90 TABLET | Refills: 0 | Status: SHIPPED | OUTPATIENT
Start: 2020-04-20 | End: 2020-07-23

## 2020-04-20 RX ORDER — PANTOPRAZOLE SODIUM 40 MG/1
TABLET, DELAYED RELEASE ORAL
Qty: 90 TABLET | Refills: 0 | Status: SHIPPED | OUTPATIENT
Start: 2020-04-20 | End: 2020-07-23

## 2020-04-20 RX ORDER — ATENOLOL 25 MG/1
TABLET ORAL
Qty: 90 TABLET | Refills: 0 | Status: SHIPPED | OUTPATIENT
Start: 2020-04-20 | End: 2020-07-23

## 2020-04-20 RX ORDER — LEVOTHYROXINE SODIUM 112 UG/1
TABLET ORAL
Qty: 90 TABLET | Refills: 0 | Status: SHIPPED | OUTPATIENT
Start: 2020-04-20 | End: 2020-07-23

## 2020-08-19 ENCOUNTER — TELEPHONE (OUTPATIENT)
Dept: FAMILY MEDICINE | Facility: OTHER | Age: 70
End: 2020-08-19

## 2020-08-19 NOTE — TELEPHONE ENCOUNTER
Pt is needing covid testing prior to flying out to [place]. Pt states she is leaving on Saturday 8/29/20 and needs to have a negative test 72 hours prior. Please call patient to schedule where appropriate.

## 2020-08-26 ENCOUNTER — OFFICE VISIT (OUTPATIENT)
Dept: FAMILY MEDICINE | Facility: OTHER | Age: 70
End: 2020-08-26
Attending: NURSE PRACTITIONER
Payer: MEDICARE

## 2020-08-26 DIAGNOSIS — Z20.822 ENCOUNTER FOR LABORATORY TESTING FOR COVID-19 VIRUS: Primary | ICD-10-CM

## 2020-08-26 PROCEDURE — U0003 INFECTIOUS AGENT DETECTION BY NUCLEIC ACID (DNA OR RNA); SEVERE ACUTE RESPIRATORY SYNDROME CORONAVIRUS 2 (SARS-COV-2) (CORONAVIRUS DISEASE [COVID-19]), AMPLIFIED PROBE TECHNIQUE, MAKING USE OF HIGH THROUGHPUT TECHNOLOGIES AS DESCRIBED BY CMS-2020-01-R: HCPCS | Mod: ZL | Performed by: NURSE PRACTITIONER

## 2020-08-28 LAB
SARS-COV-2 RNA SPEC QL NAA+PROBE: NOT DETECTED
SPECIMEN SOURCE: NORMAL

## 2020-10-23 NOTE — PROGRESS NOTES
"Aide Galarza is a 70 year old female who is being evaluated via a billable telephone visit.      The patient has been notified of following:     \"This telephone visit will be conducted via a call between you and your physician/provider. We have found that certain health care needs can be provided without the need for a physical exam.  This service lets us provide the care you need with a short phone conversation.  If a prescription is necessary we can send it directly to your pharmacy.  If lab work is needed we can place an order for that and you can then stop by our lab to have the test done at a later time.    Telephone visits are billed at different rates depending on your insurance coverage. During this emergency period, for some insurers they may be billed the same as an in-person visit.  Please reach out to your insurance provider with any questions.    If during the course of the call the physician/provider feels a telephone visit is not appropriate, you will not be charged for this service.\"    Patient has given verbal consent for Telephone visit?  Yes    What phone number would you like to be contacted at? [phone]    How would you like to obtain your AVS? MyChart    Subjective     Aide Galarza is a 70 year old female who presents via phone visit today for the following health issues:    HPI     Functional dyspepsia. Switched to protonix with minimal relief and then amitriptyline was added. Has complete improvement. Would like to continue both. Consumes a healthy diet and avoids trigger foods. No melena. No nausea or vomiting. No abdominal pain.     She tried to get off trazodone, but had significant insomnia, so she went back on it and has been doing great with no noted side effects.     Menopause age 56. Is on premarin cream as needed for vaginal dryness. No vaginal bleeding. Most recent pap smear was 1/23/15 and was negative. No history of abnormal paps. Patient denies breast changes, masses or " discharge. Current contraceptive plans: post menopausal status.      Hypothyroidism: Mild constipation, controlled. Otherwise denies skin/hair/nail changes, temperature intolerance, weight changes or heart palpitations. Taking Synthroid. Due for TSH.      HTN: Not checking BP at home. Denies CP, palpitations, SOB, edema, vision changes, headaches. Currently taking atenolol, does not want to change to metoprolol as her pharmacy continues to get the atenolol. Watches sodium intake. Exercises daily-walking 1 miles daily.    Due for a mammogram. Willing to get.     Due for the pneumococcal vaccine. Will get at her next appointment.           Review of Systems   As noted in the HPI.        Objective          Vitals:  No vitals were obtained today due to virtual visit.    healthy, alert and no distress  PSYCH: Alert and oriented times 3; coherent speech, normal   rate and volume, able to articulate logical thoughts, able   to abstract reason, no tangential thoughts, no hallucinations   or delusions  Her affect is normal  RESP: No cough, no audible wheezing, able to talk in full sentences  Remainder of exam unable to be completed due to telephone visits            Assessment & Plan     Essential hypertension  Well controlled. Continue current medications. Encouraged daily exercise and a low sodium diet. Recommended checking BP's 2x/wk, call the clinic if consistantly s>140 or d>90. Follow up in 3 months in clinic to check BP.     - Basic metabolic panel; Future  - atenolol (TENORMIN) 25 MG tablet; Take 1 tablet (25 mg) by mouth daily    Hypothyroidism, unspecified type  Will come in for lab only TSH. Will notify patient of the results when available and intervene accordingly.     - TSH with free T4 reflex; Future  - levothyroxine (SYNTHROID/LEVOTHROID) 112 MCG tablet; Take 1 tablet (112 mcg) by mouth daily    Lipid screening  - Lipid Profile; Future  -Will notify patient of the results when available and intervene  accordingly.     Primary insomnia  Controlled. Continue current medications.     - amitriptyline (ELAVIL) 25 MG tablet; Take 1 tablet (25 mg) by mouth At Bedtime  - traZODone (DESYREL) 50 MG tablet; Take 1 tablet (50 mg) by mouth At Bedtime    Gastroesophageal reflux disease without esophagitis  Controlled. Continue current medications.     - pantoprazole (PROTONIX) 40 MG EC tablet; TAKE 1 TABLET EVERY MORNING BEFORE BREAKFAST. DO NOT CRUSH.    Encounter for screening mammogram for breast cancer  - [place] Screen Bilateral w/Uri; Future  -Will notify patient of the results when available and intervene accordingly.           Tiny Issa NP  [facility] [place]    Phone call duration:  12 minutes

## 2020-10-26 ENCOUNTER — VIRTUAL VISIT (OUTPATIENT)
Dept: FAMILY MEDICINE | Facility: OTHER | Age: 70
End: 2020-10-26
Attending: NURSE PRACTITIONER
Payer: COMMERCIAL

## 2020-10-26 DIAGNOSIS — E03.9 HYPOTHYROIDISM, UNSPECIFIED TYPE: ICD-10-CM

## 2020-10-26 DIAGNOSIS — Z13.220 LIPID SCREENING: ICD-10-CM

## 2020-10-26 DIAGNOSIS — Z12.31 ENCOUNTER FOR SCREENING MAMMOGRAM FOR BREAST CANCER: ICD-10-CM

## 2020-10-26 DIAGNOSIS — K21.9 GASTROESOPHAGEAL REFLUX DISEASE WITHOUT ESOPHAGITIS: ICD-10-CM

## 2020-10-26 DIAGNOSIS — F51.01 PRIMARY INSOMNIA: ICD-10-CM

## 2020-10-26 DIAGNOSIS — I10 ESSENTIAL HYPERTENSION: Primary | ICD-10-CM

## 2020-10-26 PROCEDURE — G0463 HOSPITAL OUTPT CLINIC VISIT: HCPCS | Mod: 25,TEL

## 2020-10-26 PROCEDURE — 99214 OFFICE O/P EST MOD 30 MIN: CPT | Mod: 95 | Performed by: NURSE PRACTITIONER

## 2020-10-26 RX ORDER — ATENOLOL 25 MG/1
25 TABLET ORAL DAILY
Qty: 90 TABLET | Refills: 3 | Status: SHIPPED | OUTPATIENT
Start: 2020-10-26 | End: 2022-01-10

## 2020-10-26 RX ORDER — LEVOTHYROXINE SODIUM 112 UG/1
112 TABLET ORAL DAILY
Qty: 90 TABLET | Refills: 3 | Status: SHIPPED | OUTPATIENT
Start: 2020-10-26 | End: 2020-12-11

## 2020-10-26 RX ORDER — TRAZODONE HYDROCHLORIDE 50 MG/1
50 TABLET, FILM COATED ORAL AT BEDTIME
Qty: 90 TABLET | Refills: 3 | Status: SHIPPED | OUTPATIENT
Start: 2020-10-26 | End: 2022-01-10

## 2020-10-26 RX ORDER — PANTOPRAZOLE SODIUM 40 MG/1
TABLET, DELAYED RELEASE ORAL
Qty: 90 TABLET | Refills: 3 | Status: SHIPPED | OUTPATIENT
Start: 2020-10-26 | End: 2022-01-10

## 2020-12-09 ENCOUNTER — ANCILLARY PROCEDURE (OUTPATIENT)
Dept: MAMMOGRAPHY | Facility: OTHER | Age: 70
End: 2020-12-09
Attending: NURSE PRACTITIONER
Payer: MEDICARE

## 2020-12-09 DIAGNOSIS — Z12.31 ENCOUNTER FOR SCREENING MAMMOGRAM FOR BREAST CANCER: ICD-10-CM

## 2020-12-09 PROCEDURE — 77067 SCR MAMMO BI INCL CAD: CPT | Mod: TC

## 2020-12-10 DIAGNOSIS — Z13.220 LIPID SCREENING: ICD-10-CM

## 2020-12-10 DIAGNOSIS — E03.9 HYPOTHYROIDISM, UNSPECIFIED TYPE: ICD-10-CM

## 2020-12-10 DIAGNOSIS — I10 ESSENTIAL HYPERTENSION: ICD-10-CM

## 2020-12-10 LAB
ANION GAP SERPL CALCULATED.3IONS-SCNC: 2 MMOL/L (ref 3–14)
BUN SERPL-MCNC: 17 MG/DL (ref 7–30)
CALCIUM SERPL-MCNC: 9.1 MG/DL (ref 8.5–10.1)
CHLORIDE SERPL-SCNC: 105 MMOL/L (ref 94–109)
CHOLEST SERPL-MCNC: 228 MG/DL
CO2 SERPL-SCNC: 32 MMOL/L (ref 20–32)
CREAT SERPL-MCNC: 0.84 MG/DL (ref 0.52–1.04)
GFR SERPL CREATININE-BSD FRML MDRD: 71 ML/MIN/{1.73_M2}
GLUCOSE SERPL-MCNC: 87 MG/DL (ref 70–99)
HDLC SERPL-MCNC: 83 MG/DL
LDLC SERPL CALC-MCNC: 132 MG/DL
NONHDLC SERPL-MCNC: 145 MG/DL
POTASSIUM SERPL-SCNC: 4.1 MMOL/L (ref 3.4–5.3)
SODIUM SERPL-SCNC: 139 MMOL/L (ref 133–144)
TRIGL SERPL-MCNC: 64 MG/DL
TSH SERPL DL<=0.005 MIU/L-ACNC: 2.77 MU/L (ref 0.4–4)

## 2020-12-10 PROCEDURE — 36415 COLL VENOUS BLD VENIPUNCTURE: CPT | Mod: ZL | Performed by: NURSE PRACTITIONER

## 2020-12-10 PROCEDURE — 80048 BASIC METABOLIC PNL TOTAL CA: CPT | Mod: ZL | Performed by: NURSE PRACTITIONER

## 2020-12-10 PROCEDURE — 80061 LIPID PANEL: CPT | Mod: ZL | Performed by: NURSE PRACTITIONER

## 2020-12-10 PROCEDURE — 84443 ASSAY THYROID STIM HORMONE: CPT | Mod: ZL | Performed by: NURSE PRACTITIONER

## 2020-12-11 ENCOUNTER — TELEPHONE (OUTPATIENT)
Dept: FAMILY MEDICINE | Facility: OTHER | Age: 70
End: 2020-12-11

## 2020-12-11 DIAGNOSIS — E03.9 HYPOTHYROIDISM, UNSPECIFIED TYPE: ICD-10-CM

## 2020-12-11 RX ORDER — LEVOTHYROXINE SODIUM 112 UG/1
112 TABLET ORAL DAILY
Qty: 90 TABLET | Refills: 3 | Status: SHIPPED | OUTPATIENT
Start: 2020-12-11 | End: 2022-01-10

## 2020-12-20 ENCOUNTER — HEALTH MAINTENANCE LETTER (OUTPATIENT)
Age: 70
End: 2020-12-20

## 2021-01-19 NOTE — PROGRESS NOTES
Assessment & Plan     (E03.9) Hypothyroidism, unspecified type  (primary encounter diagnosis)  Plan: TSH normal in 12/2020. Continue synthroid.     (I10) Essential hypertension  Plan: Well controlled. Continue current medications. Encouraged daily exercise and a low sodium diet. Recommended checking BP's 2x/wk, call the clinic if consistantly s>140 or d>90. Follow up in 6 months.     (F51.01) Primary insomnia  Plan: Controlled with trazodone and amitriptyline. Will continue.     (K21.9) Gastroesophageal reflux disease without esophagitis  Plan: Controlled with trazodone and amitriptyline. Continue.     (M25.562) Acute pain of left knee  Plan: Pain slowly improving with exercises. Will continue. If these do not help, will consider MRI to r/o meniscus tear.           Return in about 1 year (around 1/22/2022).    Tiny Issa NP  [facility] [place] Noble is a 70 year old who presents to clinic today for the following health issues    HPI     Functional dyspepsia. Switched to protonix with minimal relief and then amitriptyline was added. Has complete improvement. Would like to continue both. Consumes a healthy diet and avoids trigger foods. No melena. No nausea or vomiting. No abdominal pain.    She tried to get off trazodone, but had significant insomnia, so she went back on it and has been doing great with no noted side effects. Would like to continue.     Hypothyroidism: Mild constipation, controlled. Otherwise denies skin/hair/nail changes, temperature intolerance, weight changes or heart palpitations. Taking Synthroid. TSH normal in 12/2020.     HTN: Not checking BP at home. Denies CP, palpitations, SOB, edema, vision changes, headaches. Currently taking atenolol, does not want to change to metoprolol as her pharmacy continues to get the atenolol. Watches sodium intake. Exercises daily-walking 2 miles daily.    Due for the pneumococcal vaccine. She, however, is getting her  Covid vaccine later today and would like to wait until she gets this before getting the pneumococcal vaccine.     Lastly, she complains of left knee pain. She notes that she slipped on her deck several months ago and fell onto her left shoulder and left knee. Left shoulder pain has resolved, but she still has slight knee pain. No erythema or edema. Pain is slowly improving. She is not taking anything for the pain. Going down stairs causes her the most pain. No instability noted. She has done PT for this knee in the past and still has been doing the exercises. Feels they are helping with her pain. No fevers.     Review of Systems   As noted in the HPI.       Objective    /80 (BP Location: Right arm, Patient Position: Chair, Cuff Size: Adult Regular)   Pulse 75   Temp 97  F (36.1  C) (Tympanic)   Wt 69.4 kg (153 lb)   LMP  (LMP Unknown)   SpO2 98%   BMI 25.46 kg/m    Body mass index is 25.46 kg/m .  Physical Exam   GENERAL: healthy, alert and no distress  EYES: Eyes grossly normal to inspection, PERRL and conjunctivae and sclerae normal  HENT: ear canals and TM's normal, nose and mouth without ulcers or lesions  NECK: no adenopathy, no asymmetry, masses, or scars and thyroid normal to palpation  RESP: lungs clear to auscultation - no rales, rhonchi or wheezes  CV: regular rate and rhythm, no murmur, no peripheral edema and peripheral pulses strong  ABDOMEN: soft, nontender, no masses and bowel sounds normal  NEURO: Normal strength and tone, mentation intact and speech normal  PSYCH: mentation appears normal, affect normal/bright  LEFT KNEE: Skin intact. No erythema, edema, or ecchymosis. Full ROM. Slight pain with palpation along lateral joint line. No instability noted. No pain along posterior knee.     Component      Latest Ref Rng & Units 12/10/2020   Sodium      133 - 144 mmol/L 139   Potassium      3.4 - 5.3 mmol/L 4.1   Chloride      94 - 109 mmol/L 105   Carbon Dioxide      20 - 32 mmol/L 32   Anion  Gap      3 - 14 mmol/L 2 (L)   Glucose      70 - 99 mg/dL 87   Urea Nitrogen      7 - 30 mg/dL 17   Creatinine      0.52 - 1.04 mg/dL 0.84   GFR Estimate      >60 mL/min/1.73:m2 71   GFR Estimate If Black      >60 mL/min/1.73:m2 82   Calcium      8.5 - 10.1 mg/dL 9.1   Cholesterol      <200 mg/dL 228 (H)   Triglycerides      <150 mg/dL 64   HDL Cholesterol      >49 mg/dL 83   LDL Cholesterol Calculated      <100 mg/dL 132 (H)   Non HDL Cholesterol      <130 mg/dL 145 (H)   TSH      0.40 - 4.00 mU/L 2.77

## 2021-01-22 ENCOUNTER — OFFICE VISIT (OUTPATIENT)
Dept: FAMILY MEDICINE | Facility: OTHER | Age: 71
End: 2021-01-22
Attending: NURSE PRACTITIONER
Payer: COMMERCIAL

## 2021-01-22 VITALS
WEIGHT: 153 LBS | SYSTOLIC BLOOD PRESSURE: 132 MMHG | DIASTOLIC BLOOD PRESSURE: 80 MMHG | OXYGEN SATURATION: 98 % | HEART RATE: 75 BPM | BODY MASS INDEX: 25.46 KG/M2 | TEMPERATURE: 97 F

## 2021-01-22 DIAGNOSIS — Z13.220 LIPID SCREENING: ICD-10-CM

## 2021-01-22 DIAGNOSIS — F51.01 PRIMARY INSOMNIA: ICD-10-CM

## 2021-01-22 DIAGNOSIS — M25.562 ACUTE PAIN OF LEFT KNEE: ICD-10-CM

## 2021-01-22 DIAGNOSIS — Z78.0 ASYMPTOMATIC POSTMENOPAUSAL ESTROGEN DEFICIENCY: ICD-10-CM

## 2021-01-22 DIAGNOSIS — I10 ESSENTIAL HYPERTENSION: ICD-10-CM

## 2021-01-22 DIAGNOSIS — E03.9 HYPOTHYROIDISM, UNSPECIFIED TYPE: Primary | ICD-10-CM

## 2021-01-22 DIAGNOSIS — K21.9 GASTROESOPHAGEAL REFLUX DISEASE WITHOUT ESOPHAGITIS: ICD-10-CM

## 2021-01-22 PROCEDURE — G0463 HOSPITAL OUTPT CLINIC VISIT: HCPCS

## 2021-01-22 PROCEDURE — 99214 OFFICE O/P EST MOD 30 MIN: CPT | Performed by: NURSE PRACTITIONER

## 2021-01-22 PROCEDURE — G0463 HOSPITAL OUTPT CLINIC VISIT: HCPCS | Mod: 25

## 2021-01-22 ASSESSMENT — PAIN SCALES - GENERAL: PAINLEVEL: NO PAIN (0)

## 2021-01-22 NOTE — NURSING NOTE
"Chief Complaint   Patient presents with     Gastrophageal Reflux       Initial BP (!) 142/90 (BP Location: Right arm, Patient Position: Chair, Cuff Size: Adult Regular)   Pulse 75   Temp 97  F (36.1  C) (Tympanic)   Wt 69.4 kg (153 lb)   LMP  (LMP Unknown)   SpO2 98%   BMI 25.46 kg/m   Estimated body mass index is 25.46 kg/m  as calculated from the following:    Height as of 5/6/19: 1.651 m (5' 5\").    Weight as of this encounter: 69.4 kg (153 lb).  Medication Reconciliation: complete  Lobo Rangel LPN  "

## 2021-10-03 ENCOUNTER — HEALTH MAINTENANCE LETTER (OUTPATIENT)
Age: 71
End: 2021-10-03

## 2022-01-10 DIAGNOSIS — F51.01 PRIMARY INSOMNIA: ICD-10-CM

## 2022-01-10 DIAGNOSIS — K21.9 GASTROESOPHAGEAL REFLUX DISEASE WITHOUT ESOPHAGITIS: ICD-10-CM

## 2022-01-10 DIAGNOSIS — E03.9 HYPOTHYROIDISM, UNSPECIFIED TYPE: ICD-10-CM

## 2022-01-10 DIAGNOSIS — I10 ESSENTIAL HYPERTENSION: ICD-10-CM

## 2022-01-10 PROBLEM — M76.892 TENDONITIS OF KNEE, LEFT: Status: ACTIVE | Noted: 2019-09-01

## 2022-01-10 RX ORDER — PANTOPRAZOLE SODIUM 40 MG/1
TABLET, DELAYED RELEASE ORAL
Qty: 90 TABLET | Refills: 2 | Status: SHIPPED | OUTPATIENT
Start: 2022-01-10 | End: 2022-10-07

## 2022-01-10 RX ORDER — ATENOLOL 25 MG/1
25 TABLET ORAL DAILY
Qty: 90 TABLET | Refills: 2 | Status: SHIPPED | OUTPATIENT
Start: 2022-01-10 | End: 2022-10-07

## 2022-01-10 RX ORDER — LEVOTHYROXINE SODIUM 112 UG/1
112 TABLET ORAL DAILY
Qty: 90 TABLET | Refills: 2 | Status: SHIPPED | OUTPATIENT
Start: 2022-01-10 | End: 2022-10-07

## 2022-01-10 RX ORDER — TRAZODONE HYDROCHLORIDE 50 MG/1
50 TABLET, FILM COATED ORAL AT BEDTIME
Qty: 90 TABLET | Refills: 2 | Status: SHIPPED | OUTPATIENT
Start: 2022-01-10 | End: 2022-10-07

## 2022-01-10 NOTE — TELEPHONE ENCOUNTER
Trazodone  Last Written Prescription Date: 10/26/20  Last Fill Quantity: 90 # of Refills: 3  Last Office Visit: 1/22/21    Protonix  Last Written Prescription Date: 10/26/20  Last Fill Quantity: 90 # of Refills: 3  Last Office Visit: 1/22/21    Atenolol  Last Written Prescription Date: 10/26/20  Last Fill Quantity: 90 # of Refills: 3  Last Office Visit: 1/22/21    Elavil  Last Written Prescription Date: 10/26/20  Last Fill Quantity: 90 # of Refills: 3  Last Office Visit: 1/22/21

## 2022-01-23 ENCOUNTER — HEALTH MAINTENANCE LETTER (OUTPATIENT)
Age: 72
End: 2022-01-23

## 2022-01-27 NOTE — PROGRESS NOTES
Assessment & Plan     Hypothyroidism, unspecified type  TSH normal. Continue Synthroid. F/up yearly.     Essential hypertension  Well controlled. Continue current medications. Encouraged daily exercise and a low sodium diet. Recommended checking BP's 2x/wk, call the clinic if consistantly s>140 or d>90. Follow up in 12 months.     Gastroesophageal reflux disease without esophagitis  Controlled. Continue current medications. Will avoid trigger foods.     Chronic pain of left knee  Will XR today. If ok, will order MRI as she has had this pain for years and also done PT.     - XR Knee Left 3 Views (Clinic Performed); Future    Hyperglycemia  -Fasting glucose 106. Will add on A1C for 4 weeks out as she needs other labs at that time. Will notify patient of the results when available and intervene accordingly.     Hyperlipidemia, unspecified hyperlipidemia type  The 10-year ASCVD risk score (Elsiesudeep ALFONSO Jr., et al., 2013) is: 11.7%    Values used to calculate the score:      Age: 71 years      Sex: Female      Is Non- : No      Diabetic: No      Tobacco smoker: No      Systolic Blood Pressure: 132 mmHg      Is BP treated: Yes      HDL Cholesterol: 82 mg/dL      Total Cholesterol: 241 mg/dL    Risk greater than 7.5 percent. Statin started. Will then recheck lipids and cmp in 4 weeks. Will notify patient of the results when available and intervene accordingly.     - atorvastatin (LIPITOR) 20 MG tablet; Take 1 tablet (20 mg) by mouth daily  - Comprehensive metabolic panel (BMP + Alb, Alk Phos, ALT, AST, Total. Bili, TP); Future  - Lipid Profile (Chol, Trig, HDL, LDL calc); Future      No follow-ups on file.    Tiny Issa NP  [facility] [place] is a 71 year old who presents for the following health issues    HPI     Functional dyspepsia. Switched to protonix with minimal relief and then amitriptyline was added. Has complete improvement. Would like to continue  both. Consumes a healthy diet and avoids trigger foods. No melena. No nausea or vomiting. No abdominal pain.     She tried to get off trazodone, but had significant insomnia, so she went back on it and has been doing great with no noted side effects. Would like to continue.     Hypothyroidism: Mild constipation, controlled. Otherwise denies skin/hair/nail changes, temperature intolerance, weight changes or heart palpitations. Taking Synthroid. TSH normal in 12/2020.      HTN: Not checking BP at home. Denies CP, palpitations, SOB, edema, vision changes, headaches. Currently taking atenolol, does not want to change to metoprolol as her pharmacy continues to get the atenolol. Watches sodium intake. Exercises daily-walking 2 miles daily.      Due for the pneumococcal vaccine. Willing to get.     She also complains of left knee pain. Has now been occurring for years. Worse when going up and down stairs. Her 70 pound dog pulled her down about 4 times in the past year and feels this may have made the pain worse. No erythema or edema. Occasional unstable. Describes the pain as a constant dull ache. She did do PT on her left knee in 2019 and notes that it helped somewhat. No recent imaging. Walking or stepping on her knee increases the pain.     Review of Systems   Constitutional, HEENT, cardiovascular, pulmonary, gi and gu systems are negative, except as otherwise noted.      Objective    /84 (BP Location: Right arm, Patient Position: Chair, Cuff Size: Adult Regular)   Pulse 78   Temp 97.7  F (36.5  C) (Tympanic)   Wt 72.8 kg (160 lb 9.6 oz)   LMP  (LMP Unknown)   SpO2 96%   BMI 26.73 kg/m    Body mass index is 26.73 kg/m .  Physical Exam   GENERAL: healthy, alert and no distress  EYES: Eyes grossly normal to inspection, PERRL and conjunctivae and sclerae normal  HENT: ear canals and TM's normal, nose and mouth without ulcers or lesions  NECK: no adenopathy, no asymmetry, masses, or scars and thyroid normal to  palpation  RESP: lungs clear to auscultation - no rales, rhonchi or wheezes  CV: regular rate and rhythm, no murmur, no peripheral edema and peripheral pulses strong  ABDOMEN: soft, nontender, no masses and bowel sounds normal  NEURO: Normal strength and tone, mentation intact and speech normal  PSYCH: mentation appears normal, affect normal/bright  LEFT KNEE: Skin intact. No erythema or edema. Some pain with palpation directly over patella and just laterally. No pain with flexion or extension. No pain with inversion or eversion. PT and DP pulses 2+.     Results for orders placed or performed in visit on 01/28/22 (from the past 24 hour(s))   Basic metabolic panel   Result Value Ref Range    Sodium 140 133 - 144 mmol/L    Potassium 3.9 3.4 - 5.3 mmol/L    Chloride 104 94 - 109 mmol/L    Carbon Dioxide (CO2) 31 20 - 32 mmol/L    Anion Gap 5 3 - 14 mmol/L    Urea Nitrogen 12 7 - 30 mg/dL    Creatinine 0.82 0.52 - 1.04 mg/dL    Calcium 9.4 8.5 - 10.1 mg/dL    Glucose 106 (H) 70 - 99 mg/dL    GFR Estimate 77 >60 mL/min/1.73m2   Lipid Profile   Result Value Ref Range    Cholesterol 241 (H) <200 mg/dL    Triglycerides 92 <150 mg/dL    Direct Measure HDL 82 >=50 mg/dL    LDL Cholesterol Calculated 141 (H) <=100 mg/dL    Non HDL Cholesterol 159 (H) <130 mg/dL    Patient Fasting > 8hrs? Yes     Narrative    Cholesterol  Desirable:  <200 mg/dL    Triglycerides  Normal:  Less than 150 mg/dL  Borderline High:  150-199 mg/dL  High:  200-499 mg/dL  Very High:  Greater than or equal to 500 mg/dL    Direct Measure HDL  Female:  Greater than or equal to 50 mg/dL   Male:  Greater than or equal to 40 mg/dL    LDL Cholesterol  Desirable:  <100mg/dL  Above Desirable:  100-129 mg/dL   Borderline High:  130-159 mg/dL   High:  160-189 mg/dL   Very High:  >= 190 mg/dL    Non HDL Cholesterol  Desirable:  130 mg/dL  Above Desirable:  130-159 mg/dL  Borderline High:  160-189 mg/dL  High:  190-219 mg/dL  Very High:  Greater than or equal to 220  mg/dL   TSH with free T4 reflex   Result Value Ref Range    TSH 2.76 0.40 - 4.00 mU/L

## 2022-01-28 ENCOUNTER — OFFICE VISIT (OUTPATIENT)
Dept: FAMILY MEDICINE | Facility: OTHER | Age: 72
End: 2022-01-28
Attending: NURSE PRACTITIONER
Payer: MEDICARE

## 2022-01-28 ENCOUNTER — ANCILLARY PROCEDURE (OUTPATIENT)
Dept: MAMMOGRAPHY | Facility: OTHER | Age: 72
End: 2022-01-28
Attending: NURSE PRACTITIONER
Payer: MEDICARE

## 2022-01-28 ENCOUNTER — ANCILLARY PROCEDURE (OUTPATIENT)
Dept: GENERAL RADIOLOGY | Facility: OTHER | Age: 72
End: 2022-01-28
Attending: NURSE PRACTITIONER
Payer: MEDICARE

## 2022-01-28 ENCOUNTER — LAB (OUTPATIENT)
Dept: LAB | Facility: OTHER | Age: 72
End: 2022-01-28
Attending: NURSE PRACTITIONER
Payer: MEDICARE

## 2022-01-28 ENCOUNTER — TELEPHONE (OUTPATIENT)
Dept: MAMMOGRAPHY | Facility: OTHER | Age: 72
End: 2022-01-28

## 2022-01-28 VITALS
OXYGEN SATURATION: 96 % | BODY MASS INDEX: 26.73 KG/M2 | SYSTOLIC BLOOD PRESSURE: 132 MMHG | DIASTOLIC BLOOD PRESSURE: 84 MMHG | WEIGHT: 160.6 LBS | TEMPERATURE: 97.7 F | HEART RATE: 78 BPM

## 2022-01-28 DIAGNOSIS — Z13.220 LIPID SCREENING: ICD-10-CM

## 2022-01-28 DIAGNOSIS — M25.562 CHRONIC PAIN OF LEFT KNEE: ICD-10-CM

## 2022-01-28 DIAGNOSIS — M25.562 CHRONIC PAIN OF LEFT KNEE: Primary | ICD-10-CM

## 2022-01-28 DIAGNOSIS — E78.5 HYPERLIPIDEMIA, UNSPECIFIED HYPERLIPIDEMIA TYPE: ICD-10-CM

## 2022-01-28 DIAGNOSIS — G89.29 CHRONIC PAIN OF LEFT KNEE: Primary | ICD-10-CM

## 2022-01-28 DIAGNOSIS — I10 ESSENTIAL HYPERTENSION: ICD-10-CM

## 2022-01-28 DIAGNOSIS — Z23 NEED FOR VACCINATION: ICD-10-CM

## 2022-01-28 DIAGNOSIS — Z12.31 VISIT FOR SCREENING MAMMOGRAM: ICD-10-CM

## 2022-01-28 DIAGNOSIS — R73.9 HYPERGLYCEMIA: ICD-10-CM

## 2022-01-28 DIAGNOSIS — E03.9 HYPOTHYROIDISM, UNSPECIFIED TYPE: Primary | ICD-10-CM

## 2022-01-28 DIAGNOSIS — G89.29 CHRONIC PAIN OF LEFT KNEE: ICD-10-CM

## 2022-01-28 DIAGNOSIS — E03.9 HYPOTHYROIDISM, UNSPECIFIED TYPE: ICD-10-CM

## 2022-01-28 DIAGNOSIS — K21.9 GASTROESOPHAGEAL REFLUX DISEASE WITHOUT ESOPHAGITIS: ICD-10-CM

## 2022-01-28 LAB
ANION GAP SERPL CALCULATED.3IONS-SCNC: 5 MMOL/L (ref 3–14)
BUN SERPL-MCNC: 12 MG/DL (ref 7–30)
CALCIUM SERPL-MCNC: 9.4 MG/DL (ref 8.5–10.1)
CHLORIDE BLD-SCNC: 104 MMOL/L (ref 94–109)
CHOLEST SERPL-MCNC: 241 MG/DL
CO2 SERPL-SCNC: 31 MMOL/L (ref 20–32)
CREAT SERPL-MCNC: 0.82 MG/DL (ref 0.52–1.04)
FASTING STATUS PATIENT QL REPORTED: YES
GFR SERPL CREATININE-BSD FRML MDRD: 77 ML/MIN/1.73M2
GLUCOSE BLD-MCNC: 106 MG/DL (ref 70–99)
HDLC SERPL-MCNC: 82 MG/DL
LDLC SERPL CALC-MCNC: 141 MG/DL
NONHDLC SERPL-MCNC: 159 MG/DL
POTASSIUM BLD-SCNC: 3.9 MMOL/L (ref 3.4–5.3)
SODIUM SERPL-SCNC: 140 MMOL/L (ref 133–144)
TRIGL SERPL-MCNC: 92 MG/DL
TSH SERPL DL<=0.005 MIU/L-ACNC: 2.76 MU/L (ref 0.4–4)

## 2022-01-28 PROCEDURE — 90732 PPSV23 VACC 2 YRS+ SUBQ/IM: CPT | Performed by: NURSE PRACTITIONER

## 2022-01-28 PROCEDURE — G0009 ADMIN PNEUMOCOCCAL VACCINE: HCPCS

## 2022-01-28 PROCEDURE — 36415 COLL VENOUS BLD VENIPUNCTURE: CPT | Mod: ZL

## 2022-01-28 PROCEDURE — 77067 SCR MAMMO BI INCL CAD: CPT | Mod: TC

## 2022-01-28 PROCEDURE — 82374 ASSAY BLOOD CARBON DIOXIDE: CPT | Mod: ZL

## 2022-01-28 PROCEDURE — 84443 ASSAY THYROID STIM HORMONE: CPT | Mod: ZL

## 2022-01-28 PROCEDURE — 99214 OFFICE O/P EST MOD 30 MIN: CPT | Performed by: NURSE PRACTITIONER

## 2022-01-28 PROCEDURE — 73562 X-RAY EXAM OF KNEE 3: CPT | Mod: TC,LT

## 2022-01-28 PROCEDURE — 80061 LIPID PANEL: CPT | Mod: ZL

## 2022-01-28 PROCEDURE — G0009 ADMIN PNEUMOCOCCAL VACCINE: HCPCS | Performed by: NURSE PRACTITIONER

## 2022-01-28 PROCEDURE — G0463 HOSPITAL OUTPT CLINIC VISIT: HCPCS | Mod: 25

## 2022-01-28 PROCEDURE — 90732 PPSV23 VACC 2 YRS+ SUBQ/IM: CPT

## 2022-01-28 RX ORDER — ATORVASTATIN CALCIUM 20 MG/1
20 TABLET, FILM COATED ORAL DAILY
Qty: 90 TABLET | Refills: 3 | Status: SHIPPED | OUTPATIENT
Start: 2022-01-28 | End: 2023-01-06

## 2022-01-28 ASSESSMENT — PAIN SCALES - GENERAL: PAINLEVEL: NO PAIN (0)

## 2022-01-28 NOTE — NURSING NOTE
"Chief Complaint   Patient presents with     Hypertension     Thyroid Problem       Initial BP (!) 150/90 (BP Location: Right arm, Patient Position: Chair, Cuff Size: Adult Regular)   Pulse 78   Temp 97.7  F (36.5  C) (Tympanic)   Wt 72.8 kg (160 lb 9.6 oz)   LMP  (LMP Unknown)   SpO2 96%   BMI 26.73 kg/m   Estimated body mass index is 26.73 kg/m  as calculated from the following:    Height as of 5/6/19: 1.651 m (5' 5\").    Weight as of this encounter: 72.8 kg (160 lb 9.6 oz).  Medication Reconciliation: complete  Diana Calabrese LPN  "

## 2022-02-04 ENCOUNTER — HOSPITAL ENCOUNTER (OUTPATIENT)
Dept: MRI IMAGING | Facility: HOSPITAL | Age: 72
Discharge: HOME | End: 2022-02-04
Attending: NURSE PRACTITIONER | Admitting: NURSE PRACTITIONER
Payer: MEDICARE

## 2022-02-04 DIAGNOSIS — M25.562 CHRONIC PAIN OF LEFT KNEE: ICD-10-CM

## 2022-02-04 DIAGNOSIS — M76.892 TENDONITIS OF KNEE, LEFT: ICD-10-CM

## 2022-02-04 DIAGNOSIS — G89.29 CHRONIC PAIN OF LEFT KNEE: ICD-10-CM

## 2022-02-04 DIAGNOSIS — M25.562 LEFT KNEE PAIN, UNSPECIFIED CHRONICITY: Primary | ICD-10-CM

## 2022-02-04 PROCEDURE — 73721 MRI JNT OF LWR EXTRE W/O DYE: CPT | Mod: LT

## 2022-02-10 ENCOUNTER — TRANSFERRED RECORDS (OUTPATIENT)
Dept: HEALTH INFORMATION MANAGEMENT | Facility: HOSPITAL | Age: 72
End: 2022-02-10
Payer: COMMERCIAL

## 2022-02-15 PROBLEM — E78.5 HYPERLIPIDEMIA, UNSPECIFIED HYPERLIPIDEMIA TYPE: Status: ACTIVE | Noted: 2022-02-15

## 2022-02-15 NOTE — PROGRESS NOTES
[facility] [place]  [address]  [place] [address]  Phone: [phone]  Primary Provider: Mayi Toth  Pre-op Performing Provider: MAYI TOTH      PREOPERATIVE EVALUATION:  Today's date: 2/17/2022    Aide Galarza is a 71 year old female who presents for a preoperative evaluation.    Surgical Information:  Surgery/Procedure: Left Knee Arthroscopy   Surgery Location: [facility]  Surgeon: Dr. Nava  Surgery Date: 2/24/22  Where patient plans to recover: At home with family  Fax number for surgical facility: Note does not need to be faxed, will be available electronically in Epic.    Type of Anesthesia Anticipated: to be determined    Assessment & Plan     The proposed surgical procedure is considered INTERMEDIATE risk.    Preop general physical exam  Covid testing scheduled. 2/20/22.  Mets greater than 4.   - EKG 12-lead complete w/read - (Clinic Performed)  - CBC with platelets and differential; Future  - Comprehensive metabolic panel (BMP + Alb, Alk Phos, ALT, AST, Total. Bili, TP); Future  - Lipid Profile (Chol, Trig, HDL, LDL calc); Future  - CBC with platelets and differential  - Comprehensive metabolic panel (BMP + Alb, Alk Phos, ALT, AST, Total. Bili, TP)  - Lipid Profile (Chol, Trig, HDL, LDL calc)    Left knee pain, unspecified chronicity  Tendonitis of knee, left  Tear of lateral cartilage or meniscus of knee, current, left, subsequent encounter    Gastroesophageal reflux disease without esophagitis  Functional dyspepsia. Switched to protonix with minimal relief and then amitriptyline was added. Has complete improvement. Would like to continue both. Consumes a healthy diet and avoids trigger foods. No melena. No nausea or vomiting. No abdominal pain.     Essential hypertension  Patient has longstanding history of HTN , currently denies any symptoms referable to elevated blood pressure. Specifically denies chest pain, palpitations, dyspnea, orthopnea, PND or peripheral  edema. Blood pressure readings have been in normal range. Taking atenolol without side effects.  Patient denies any side effects of medication.     Primary insomnia  She tried to get off trazodone, but had significant insomnia, so she went back on it and has been doing great with no noted side effects. Would like to continue.    Hypothyroidism, unspecified type  Patient has a longstanding history of chronic Hypothyroidism. Patient has been doing well, noting no tremor, insomnia, hair loss or changes in skin texture. Continues to take medications as directed, without adverse reactions or side effects. Last TSH normal.     Lab Results   Component Value Date    TSH 2.76 01/28/2022     Hyperlipidemia, unspecified hyperlipidemia type  Started on atorvastatin last month. Recheck today of lipid levels and liver enzymes.   Taking atorvastatin 20 mg without side effects.   - Lipid Profile (Chol, Trig, HDL, LDL calc); Future  - Lipid Profile (Chol, Trig, HDL, LDL calc)      Hyperglycemia   A1C checked today.    - Hemoglobin A1c; Future  - Hemoglobin A1c      Risks and Recommendations:  The patient has the following additional risks and recommendations for perioperative complications:  Pulmonary:    - Incentive spirometry post-op    RECOMMENDATION:  APPROVAL GIVEN to proceed with proposed procedure, without further diagnostic evaluation.      Subjective     HPI related to upcoming procedure: Patient with chronic left knee pain. MRI done on 2/4/22. See results below. Plans to proceed with scope. See  MRI copied under ROS.       Preop Questions 2/11/2022   1. Have you ever had a heart attack or stroke? No   2. Have you ever had surgery on your heart or blood vessels, such as a stent placement, a coronary artery bypass, or surgery on an artery in your head, neck, heart, or legs? No   3. Do you have chest pain with activity? No   4. Do you have a history of  heart failure? No   5. Do you currently have a cold, bronchitis or  symptoms of other infection? No   6. Do you have a cough, shortness of breath, or wheezing? No   7. Do you or anyone in your family have previous history of blood clots? No   8. Do you or does anyone in your family have a serious bleeding problem such as prolonged bleeding following surgeries or cuts? No   9. Have you ever had problems with anemia or been told to take iron pills? YES - in the past   10. Have you had any abnormal blood loss such as black, tarry or bloody stools, or abnormal vaginal bleeding? No   11. Have you ever had a blood transfusion? No   12. Are you willing to have a blood transfusion if it is medically needed before, during, or after your surgery? Yes   13. Have you or any of your relatives ever had problems with anesthesia? YES - 2011 nausea & vomiting    14. Do you have sleep apnea, excessive snoring or daytime drowsiness? No   15. Do you have any artifical heart valves or other implanted medical devices like a pacemaker, defibrillator, or continuous glucose monitor? No   16. Do you have artificial joints? No   17. Are you allergic to latex? No       Health Care Directive:  Patient does not have a Health Care Directive or Living Will: Patient states has Advance Directive and will bring in a copy to clinic.    Preoperative Review of :   reviewed - no record of controlled substances prescribed.      Status of Chronic Conditions:  See Assessment and Plan        Review of Systems  Constitutional, neuro, ENT, endocrine, pulmonary, cardiac, gastrointestinal, genitourinary, musculoskeletal, integument and psychiatric systems are negative, except as otherwise noted.    ---------------------------------------------------------------  Examination: MR KNEE LEFT W/O CONTRAST  2/4/2022 8:36 AM     Clinical History: Female, age 71 years,  Knee instability; Chronic  pain of left knee; Chronic pain of left knee     Comparison: Left knee x-rays 1/20/2022     Technique:  Sagittal proton density fat  saturation, T2; axial proton  density without with fat saturation; coronal proton-density fat  saturation T1.     Findings:   The medial collateral ligament, medial meniscus and weightbearing  articular cartilage of the medial compartment are intact.     The anterior cruciate and posterior cruciate ligaments are intact.  Components of the extensor mechanisms are intact with moderate  tendinosis in the distal fibers of the quadriceps tendon.     There is a moderately complex tear extending to the superior articular  surface within the anterior horn and body of the lateral meniscus.  Weight-bearing articular cartilage of the lateral compartment is  intact. Components of the lateral collateral ligament complex are  intact.     There is lateral subluxation of the patella in relation to the  trochlea with chronic and full-thickness defects and subchondral edema  involving the patellar apex extending into the adjacent portions of  the facets. The lateral trochlea is asymmetrically larger than the  medial portion. The trochlear articular cartilage is normal in  thickness. Muscles of the knee are normal in bulk and contour.      Neurovascular structures are normal.                                                                         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     Superior articular surface tear involving the anterior horn and body  of the lateral meniscus.     Findings suggesting a patellar tracking abnormality with enlarged  lateral trochlear and likely chronic lateral subluxation of the  patella. No evidence of acute or subacute retinacular injury. No  evidence of recent patellar dislocation.     JENNIFER OLGUIN MD   ---------------------------------------          Patient Active Problem List    Diagnosis Date Noted     Tendonitis of knee, left 09/01/2019     Priority: Medium      H/O colonoscopy 05/06/2019     Priority: Medium     Done in 9/2016, results normal, repeat 10 years       [place] cardiac risk 6% in next 10 years 05/06/2019     Priority: Medium     Osteopenia 02/10/2017     Priority: Medium     Postmenopausal status 02/10/2017     Priority: Medium     Encounter for general adult medical examination without abnormal findings 01/29/2016     Priority: Medium     Overview:   01/29/2016  Pap: normal 1/2015,  Mammogram: Done today  Colonoscopy: Due 6/2016, referral sent  DEXA: Due 2/17       Insomnia 10/31/2007     Priority: Medium     Gastroesophageal reflux disease 02/22/2007     Priority: Medium     Essential hypertension 06/04/2004     Priority: Medium     Overview:   Hx of occasional labile HTN  IMO Update       Hypothyroidism 06/04/2004     Priority: Medium     Overview:   IMO Update 10/11        Past Medical History:   Diagnosis Date     Dyspepsia      Hypertension      Hypothyroidism      Past Surgical History:   Procedure Laterality Date     caraitd Left 2011    partial facial disection with malignant tumor- [facility]      COLONOSCOPY N/A 9/30/2016    Procedure: COLONOSCOPY;  Surgeon: Gaurav Santana DO;  Location: [facility]     COLONOSCOPY      normal- every 10 years- [facility]      Current Outpatient Medications   Medication Sig Dispense Refill     amitriptyline (ELAVIL) 25 MG tablet TAKE 1 TABLET (25 MG) BY MOUTH AT BEDTIME 90 tablet 2     atenolol (TENORMIN) 25 MG tablet TAKE 1 TABLET (25 MG) BY MOUTH DAILY 90 tablet 2     atorvastatin (LIPITOR) 20 MG tablet Take 1 tablet (20 mg) by mouth daily 90 tablet 3     Calcium-Vitamin D 600-200 MG-UNIT TABS Take 1 tablet by mouth daily       levothyroxine (SYNTHROID/LEVOTHROID) 112 MCG tablet TAKE 1 TABLET (112 MCG) BY MOUTH DAILY 90 tablet 2     MULTIPLE VITAMINS PO Take 1 tablet by mouth daily       pantoprazole (PROTONIX) 40 MG EC tablet TAKE 1 TABLET BY MOUTH EVERY MORNING BEFORE BREAKFAST. DO NOT CRUSH.  90 tablet 2     traZODone (DESYREL) 50 MG tablet TAKE 1 TABLET (50 MG) BY MOUTH AT BEDTIME 90 tablet 2       No Known Allergies     Social History     Tobacco Use     Smoking status: Never Smoker     Smokeless tobacco: Never Used   Substance Use Topics     Alcohol use: Yes     Comment: daily- wine      Family History   Problem Relation Age of Onset     Hypertension Mother      Parkinsonism Father      Hypertension Father      Hypothyroidism Sister      Hypothyroidism Sister      Hypothyroidism Sister      Hypothyroidism Sister      History   Drug Use Unknown         Objective     LMP  (LMP Unknown)     Physical Exam    GENERAL APPEARANCE: healthy, alert and no distress     EYES: EOMI, PERRL     HENT: ear canals and TM's normal and nose and mouth without ulcers or lesions     NECK: no adenopathy, no asymmetry, masses, or scars and thyroid normal to palpation     RESP: lungs clear to auscultation - no rales, rhonchi or wheezes     CV: regular rates and rhythm, normal S1 S2, no S3 or S4 and no murmur, click or rub     ABDOMEN:  soft, nontender, no HSM or masses and bowel sounds normal     MS: ambulates with limp. Extremities normal- no gross deformities noted, no evidence of inflammation in joints, FROM in all extremities.     SKIN: no suspicious lesions or rashes     NEURO: Normal strength and tone, sensory exam grossly normal, mentation intact and speech normal     PSYCH: mentation appears normal. and affect normal/bright     LYMPHATICS: No cervical adenopathy    Recent Labs   Lab Test 01/28/22  0913 12/10/20  0806    139   POTASSIUM 3.9 4.1   CR 0.82 0.84        Diagnostics:  Results for orders placed or performed in visit on 02/18/22   Comprehensive metabolic panel (BMP + Alb, Alk Phos, ALT, AST, Total. Bili, TP)     Status: Normal   Result Value Ref Range    Sodium 140 133 - 144 mmol/L    Potassium 3.9 3.4 - 5.3 mmol/L    Chloride 106 94 - 109 mmol/L    Carbon Dioxide (CO2) 31 20 - 32 mmol/L    Anion Gap 3 3  - 14 mmol/L    Urea Nitrogen 13 7 - 30 mg/dL    Creatinine 0.86 0.52 - 1.04 mg/dL    Calcium 9.6 8.5 - 10.1 mg/dL    Glucose 97 70 - 99 mg/dL    Alkaline Phosphatase 50 40 - 150 U/L    AST 17 0 - 45 U/L    ALT 26 0 - 50 U/L    Protein Total 7.6 6.8 - 8.8 g/dL    Albumin 3.9 3.4 - 5.0 g/dL    Bilirubin Total 0.6 0.2 - 1.3 mg/dL    GFR Estimate 73 >60 mL/min/1.73m2   Hemoglobin A1c     Status: None   Result Value Ref Range    Estimated Average Glucose 111 mg/dL    Hemoglobin A1C 5.5 0.0 - 5.6 %   Lipid Profile (Chol, Trig, HDL, LDL calc)     Status: None   Result Value Ref Range    Cholesterol 163 <200 mg/dL    Triglycerides 64 <150 mg/dL    Direct Measure HDL 77 >=50 mg/dL    LDL Cholesterol Calculated 73 <=100 mg/dL    Non HDL Cholesterol 86 <130 mg/dL    Patient Fasting > 8hrs? Yes     Narrative    Cholesterol  Desirable:  <200 mg/dL    Triglycerides  Normal:  Less than 150 mg/dL  Borderline High:  150-199 mg/dL  High:  200-499 mg/dL  Very High:  Greater than or equal to 500 mg/dL    Direct Measure HDL  Female:  Greater than or equal to 50 mg/dL   Male:  Greater than or equal to 40 mg/dL    LDL Cholesterol  Desirable:  <100mg/dL  Above Desirable:  100-129 mg/dL   Borderline High:  130-159 mg/dL   High:  160-189 mg/dL   Very High:  >= 190 mg/dL    Non HDL Cholesterol  Desirable:  130 mg/dL  Above Desirable:  130-159 mg/dL  Borderline High:  160-189 mg/dL  High:  190-219 mg/dL  Very High:  Greater than or equal to 220 mg/dL   CBC with platelets and differential     Status: None   Result Value Ref Range    WBC Count 4.5 4.0 - 11.0 10e3/uL    RBC Count 4.90 3.80 - 5.20 10e6/uL    Hemoglobin 14.2 11.7 - 15.7 g/dL    Hematocrit 43.7 35.0 - 47.0 %    MCV 89 78 - 100 fL    MCH 29.0 26.5 - 33.0 pg    MCHC 32.5 31.5 - 36.5 g/dL    RDW 13.1 10.0 - 15.0 %    Platelet Count 223 150 - 450 10e3/uL    % Neutrophils 51 %    % Lymphocytes 35 %    % Monocytes 11 %    % Eosinophils 2 %    % Basophils 1 %    % Immature Granulocytes 0 %     NRBCs per 100 WBC 0 <1 /100    Absolute Neutrophils 2.3 1.6 - 8.3 10e3/uL    Absolute Lymphocytes 1.6 0.8 - 5.3 10e3/uL    Absolute Monocytes 0.5 0.0 - 1.3 10e3/uL    Absolute Eosinophils 0.1 0.0 - 0.7 10e3/uL    Absolute Basophils 0.0 0.0 - 0.2 10e3/uL    Absolute Immature Granulocytes 0.0 <=0.4 10e3/uL    Absolute NRBCs 0.0 10e3/uL   CBC with platelets and differential     Status: None    Narrative    The following orders were created for panel order CBC with platelets and differential.  Procedure                               Abnormality         Status                     ---------                               -----------         ------                     CBC with platelets and d...[[id number]]                      Final result                 Please view results for these tests on the individual orders.        EKG: appears normal, NSR, normal axis, normal intervals, no acute ST/T changes c/w ischemia, no LVH by voltage criteria, unchanged from previous tracings    Revised Cardiac Risk Index (RCRI):  The patient has the following serious cardiovascular risks for perioperative complications:   - No serious cardiac risks = 0 points     RCRI Interpretation: 0 points: Class I (very low risk - 0.4% complication rate)           Signed Electronically by: WILLIAM Abrams CNP    Copy of this evaluation report is provided to requesting physician.

## 2022-02-15 NOTE — H&P (VIEW-ONLY)
[facility] - [place]  [address]  [place] [address]  Phone: [phone]  Primary Provider: Mayi Toth  Pre-op Performing Provider: MAYI TOTH      PREOPERATIVE EVALUATION:  Today's date: 2/17/2022    Aide Galarza is a 71 year old female who presents for a preoperative evaluation.    Surgical Information:  Surgery/Procedure: Left Knee Arthroscopy   Surgery Location: [facility]  Surgeon: Dr. Nava  Surgery Date: 2/24/22  Where patient plans to recover: At home with family  Fax number for surgical facility: Note does not need to be faxed, will be available electronically in Epic.    Type of Anesthesia Anticipated: to be determined    Assessment & Plan     The proposed surgical procedure is considered INTERMEDIATE risk.    Preop general physical exam  Covid testing scheduled. 2/20/22.  Mets greater than 4.   - EKG 12-lead complete w/read - (Clinic Performed)  - CBC with platelets and differential; Future  - Comprehensive metabolic panel (BMP + Alb, Alk Phos, ALT, AST, Total. Bili, TP); Future  - Lipid Profile (Chol, Trig, HDL, LDL calc); Future  - CBC with platelets and differential  - Comprehensive metabolic panel (BMP + Alb, Alk Phos, ALT, AST, Total. Bili, TP)  - Lipid Profile (Chol, Trig, HDL, LDL calc)    Left knee pain, unspecified chronicity  Tendonitis of knee, left  Tear of lateral cartilage or meniscus of knee, current, left, subsequent encounter    Gastroesophageal reflux disease without esophagitis  Functional dyspepsia. Switched to protonix with minimal relief and then amitriptyline was added. Has complete improvement. Would like to continue both. Consumes a healthy diet and avoids trigger foods. No melena. No nausea or vomiting. No abdominal pain.     Essential hypertension  Patient has longstanding history of HTN , currently denies any symptoms referable to elevated blood pressure. Specifically denies chest pain, palpitations, dyspnea, orthopnea, PND or peripheral  edema. Blood pressure readings have been in normal range. Taking atenolol without side effects.  Patient denies any side effects of medication.     Primary insomnia  She tried to get off trazodone, but had significant insomnia, so she went back on it and has been doing great with no noted side effects. Would like to continue.    Hypothyroidism, unspecified type  Patient has a longstanding history of chronic Hypothyroidism. Patient has been doing well, noting no tremor, insomnia, hair loss or changes in skin texture. Continues to take medications as directed, without adverse reactions or side effects. Last TSH normal.     Lab Results   Component Value Date    TSH 2.76 01/28/2022     Hyperlipidemia, unspecified hyperlipidemia type  Started on atorvastatin last month. Recheck today of lipid levels and liver enzymes.   Taking atorvastatin 20 mg without side effects.   - Lipid Profile (Chol, Trig, HDL, LDL calc); Future  - Lipid Profile (Chol, Trig, HDL, LDL calc)      Hyperglycemia   A1C checked today.    - Hemoglobin A1c; Future  - Hemoglobin A1c      Risks and Recommendations:  The patient has the following additional risks and recommendations for perioperative complications:  Pulmonary:    - Incentive spirometry post-op    RECOMMENDATION:  APPROVAL GIVEN to proceed with proposed procedure, without further diagnostic evaluation.      Subjective     HPI related to upcoming procedure: Patient with chronic left knee pain. MRI done on 2/4/22. See results below. Plans to proceed with scope. See  MRI copied under ROS.       Preop Questions 2/11/2022   1. Have you ever had a heart attack or stroke? No   2. Have you ever had surgery on your heart or blood vessels, such as a stent placement, a coronary artery bypass, or surgery on an artery in your head, neck, heart, or legs? No   3. Do you have chest pain with activity? No   4. Do you have a history of  heart failure? No   5. Do you currently have a cold, bronchitis or  symptoms of other infection? No   6. Do you have a cough, shortness of breath, or wheezing? No   7. Do you or anyone in your family have previous history of blood clots? No   8. Do you or does anyone in your family have a serious bleeding problem such as prolonged bleeding following surgeries or cuts? No   9. Have you ever had problems with anemia or been told to take iron pills? YES - in the past   10. Have you had any abnormal blood loss such as black, tarry or bloody stools, or abnormal vaginal bleeding? No   11. Have you ever had a blood transfusion? No   12. Are you willing to have a blood transfusion if it is medically needed before, during, or after your surgery? Yes   13. Have you or any of your relatives ever had problems with anesthesia? YES - 2011 nausea & vomiting    14. Do you have sleep apnea, excessive snoring or daytime drowsiness? No   15. Do you have any artifical heart valves or other implanted medical devices like a pacemaker, defibrillator, or continuous glucose monitor? No   16. Do you have artificial joints? No   17. Are you allergic to latex? No       Health Care Directive:  Patient does not have a Health Care Directive or Living Will: Patient states has Advance Directive and will bring in a copy to clinic.    Preoperative Review of :   reviewed - no record of controlled substances prescribed.      Status of Chronic Conditions:  See Assessment and Plan        Review of Systems  Constitutional, neuro, ENT, endocrine, pulmonary, cardiac, gastrointestinal, genitourinary, musculoskeletal, integument and psychiatric systems are negative, except as otherwise noted.    ---------------------------------------------------------------  Examination: MR KNEE LEFT W/O CONTRAST  2/4/2022 8:36 AM     Clinical History: Female, age 71 years,  Knee instability; Chronic  pain of left knee; Chronic pain of left knee     Comparison: Left knee x-rays 1/20/2022     Technique:  Sagittal proton density fat  saturation, T2; axial proton  density without with fat saturation; coronal proton-density fat  saturation T1.     Findings:   The medial collateral ligament, medial meniscus and weightbearing  articular cartilage of the medial compartment are intact.     The anterior cruciate and posterior cruciate ligaments are intact.  Components of the extensor mechanisms are intact with moderate  tendinosis in the distal fibers of the quadriceps tendon.     There is a moderately complex tear extending to the superior articular  surface within the anterior horn and body of the lateral meniscus.  Weight-bearing articular cartilage of the lateral compartment is  intact. Components of the lateral collateral ligament complex are  intact.     There is lateral subluxation of the patella in relation to the  trochlea with chronic and full-thickness defects and subchondral edema  involving the patellar apex extending into the adjacent portions of  the facets. The lateral trochlea is asymmetrically larger than the  medial portion. The trochlear articular cartilage is normal in  thickness. Muscles of the knee are normal in bulk and contour.      Neurovascular structures are normal.                                                                         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     Superior articular surface tear involving the anterior horn and body  of the lateral meniscus.     Findings suggesting a patellar tracking abnormality with enlarged  lateral trochlear and likely chronic lateral subluxation of the  patella. No evidence of acute or subacute retinacular injury. No  evidence of recent patellar dislocation.     JENNIFER OLGUIN MD   ---------------------------------------          Patient Active Problem List    Diagnosis Date Noted     Tendonitis of knee, left 09/01/2019     Priority: Medium      H/O colonoscopy 05/06/2019     Priority: Medium     Done in 9/2016, results normal, repeat 10 years       [place] cardiac risk 6% in next 10 years 05/06/2019     Priority: Medium     Osteopenia 02/10/2017     Priority: Medium     Postmenopausal status 02/10/2017     Priority: Medium     Encounter for general adult medical examination without abnormal findings 01/29/2016     Priority: Medium     Overview:   01/29/2016  Pap: normal 1/2015,  Mammogram: Done today  Colonoscopy: Due 6/2016, referral sent  DEXA: Due 2/17       Insomnia 10/31/2007     Priority: Medium     Gastroesophageal reflux disease 02/22/2007     Priority: Medium     Essential hypertension 06/04/2004     Priority: Medium     Overview:   Hx of occasional labile HTN  IMO Update       Hypothyroidism 06/04/2004     Priority: Medium     Overview:   IMO Update 10/11        Past Medical History:   Diagnosis Date     Dyspepsia      Hypertension      Hypothyroidism      Past Surgical History:   Procedure Laterality Date     caraitd Left 2011    partial facial disection with malignant tumor- [facility]      COLONOSCOPY N/A 9/30/2016    Procedure: COLONOSCOPY;  Surgeon: Gaurav Santana DO;  Location: [facility]     COLONOSCOPY      normal- every 10 years- [facility]      Current Outpatient Medications   Medication Sig Dispense Refill     amitriptyline (ELAVIL) 25 MG tablet TAKE 1 TABLET (25 MG) BY MOUTH AT BEDTIME 90 tablet 2     atenolol (TENORMIN) 25 MG tablet TAKE 1 TABLET (25 MG) BY MOUTH DAILY 90 tablet 2     atorvastatin (LIPITOR) 20 MG tablet Take 1 tablet (20 mg) by mouth daily 90 tablet 3     Calcium-Vitamin D 600-200 MG-UNIT TABS Take 1 tablet by mouth daily       levothyroxine (SYNTHROID/LEVOTHROID) 112 MCG tablet TAKE 1 TABLET (112 MCG) BY MOUTH DAILY 90 tablet 2     MULTIPLE VITAMINS PO Take 1 tablet by mouth daily       pantoprazole (PROTONIX) 40 MG EC tablet TAKE 1 TABLET BY MOUTH EVERY MORNING BEFORE BREAKFAST. DO NOT CRUSH.  90 tablet 2     traZODone (DESYREL) 50 MG tablet TAKE 1 TABLET (50 MG) BY MOUTH AT BEDTIME 90 tablet 2       No Known Allergies     Social History     Tobacco Use     Smoking status: Never Smoker     Smokeless tobacco: Never Used   Substance Use Topics     Alcohol use: Yes     Comment: daily- wine      Family History   Problem Relation Age of Onset     Hypertension Mother      Parkinsonism Father      Hypertension Father      Hypothyroidism Sister      Hypothyroidism Sister      Hypothyroidism Sister      Hypothyroidism Sister      History   Drug Use Unknown         Objective     LMP  (LMP Unknown)     Physical Exam    GENERAL APPEARANCE: healthy, alert and no distress     EYES: EOMI, PERRL     HENT: ear canals and TM's normal and nose and mouth without ulcers or lesions     NECK: no adenopathy, no asymmetry, masses, or scars and thyroid normal to palpation     RESP: lungs clear to auscultation - no rales, rhonchi or wheezes     CV: regular rates and rhythm, normal S1 S2, no S3 or S4 and no murmur, click or rub     ABDOMEN:  soft, nontender, no HSM or masses and bowel sounds normal     MS: ambulates with limp. Extremities normal- no gross deformities noted, no evidence of inflammation in joints, FROM in all extremities.     SKIN: no suspicious lesions or rashes     NEURO: Normal strength and tone, sensory exam grossly normal, mentation intact and speech normal     PSYCH: mentation appears normal. and affect normal/bright     LYMPHATICS: No cervical adenopathy    Recent Labs   Lab Test 01/28/22  0913 12/10/20  0806    139   POTASSIUM 3.9 4.1   CR 0.82 0.84        Diagnostics:  Results for orders placed or performed in visit on 02/18/22   Comprehensive metabolic panel (BMP + Alb, Alk Phos, ALT, AST, Total. Bili, TP)     Status: Normal   Result Value Ref Range    Sodium 140 133 - 144 mmol/L    Potassium 3.9 3.4 - 5.3 mmol/L    Chloride 106 94 - 109 mmol/L    Carbon Dioxide (CO2) 31 20 - 32 mmol/L    Anion Gap 3 3  - 14 mmol/L    Urea Nitrogen 13 7 - 30 mg/dL    Creatinine 0.86 0.52 - 1.04 mg/dL    Calcium 9.6 8.5 - 10.1 mg/dL    Glucose 97 70 - 99 mg/dL    Alkaline Phosphatase 50 40 - 150 U/L    AST 17 0 - 45 U/L    ALT 26 0 - 50 U/L    Protein Total 7.6 6.8 - 8.8 g/dL    Albumin 3.9 3.4 - 5.0 g/dL    Bilirubin Total 0.6 0.2 - 1.3 mg/dL    GFR Estimate 73 >60 mL/min/1.73m2   Hemoglobin A1c     Status: None   Result Value Ref Range    Estimated Average Glucose 111 mg/dL    Hemoglobin A1C 5.5 0.0 - 5.6 %   Lipid Profile (Chol, Trig, HDL, LDL calc)     Status: None   Result Value Ref Range    Cholesterol 163 <200 mg/dL    Triglycerides 64 <150 mg/dL    Direct Measure HDL 77 >=50 mg/dL    LDL Cholesterol Calculated 73 <=100 mg/dL    Non HDL Cholesterol 86 <130 mg/dL    Patient Fasting > 8hrs? Yes     Narrative    Cholesterol  Desirable:  <200 mg/dL    Triglycerides  Normal:  Less than 150 mg/dL  Borderline High:  150-199 mg/dL  High:  200-499 mg/dL  Very High:  Greater than or equal to 500 mg/dL    Direct Measure HDL  Female:  Greater than or equal to 50 mg/dL   Male:  Greater than or equal to 40 mg/dL    LDL Cholesterol  Desirable:  <100mg/dL  Above Desirable:  100-129 mg/dL   Borderline High:  130-159 mg/dL   High:  160-189 mg/dL   Very High:  >= 190 mg/dL    Non HDL Cholesterol  Desirable:  130 mg/dL  Above Desirable:  130-159 mg/dL  Borderline High:  160-189 mg/dL  High:  190-219 mg/dL  Very High:  Greater than or equal to 220 mg/dL   CBC with platelets and differential     Status: None   Result Value Ref Range    WBC Count 4.5 4.0 - 11.0 10e3/uL    RBC Count 4.90 3.80 - 5.20 10e6/uL    Hemoglobin 14.2 11.7 - 15.7 g/dL    Hematocrit 43.7 35.0 - 47.0 %    MCV 89 78 - 100 fL    MCH 29.0 26.5 - 33.0 pg    MCHC 32.5 31.5 - 36.5 g/dL    RDW 13.1 10.0 - 15.0 %    Platelet Count 223 150 - 450 10e3/uL    % Neutrophils 51 %    % Lymphocytes 35 %    % Monocytes 11 %    % Eosinophils 2 %    % Basophils 1 %    % Immature Granulocytes 0 %     NRBCs per 100 WBC 0 <1 /100    Absolute Neutrophils 2.3 1.6 - 8.3 10e3/uL    Absolute Lymphocytes 1.6 0.8 - 5.3 10e3/uL    Absolute Monocytes 0.5 0.0 - 1.3 10e3/uL    Absolute Eosinophils 0.1 0.0 - 0.7 10e3/uL    Absolute Basophils 0.0 0.0 - 0.2 10e3/uL    Absolute Immature Granulocytes 0.0 <=0.4 10e3/uL    Absolute NRBCs 0.0 10e3/uL   CBC with platelets and differential     Status: None    Narrative    The following orders were created for panel order CBC with platelets and differential.  Procedure                               Abnormality         Status                     ---------                               -----------         ------                     CBC with platelets and d...[[id number]]                      Final result                 Please view results for these tests on the individual orders.        EKG: appears normal, NSR, normal axis, normal intervals, no acute ST/T changes c/w ischemia, no LVH by voltage criteria, unchanged from previous tracings    Revised Cardiac Risk Index (RCRI):  The patient has the following serious cardiovascular risks for perioperative complications:   - No serious cardiac risks = 0 points     RCRI Interpretation: 0 points: Class I (very low risk - 0.4% complication rate)           Signed Electronically by: WILLIAM Abrams CNP    Copy of this evaluation report is provided to requesting physician.

## 2022-02-15 NOTE — PATIENT INSTRUCTIONS
Preparing for Your Surgery  Getting started  A nurse will call you to review your health history and instructions. They will give you an arrival time based on your scheduled surgery time. Please be ready to share:    Your doctor's clinic name and phone number    Your medical, surgical and anesthesia history    A list of allergies and sensitivities    A list of medicines, including herbal treatments and over-the-counter drugs    Whether the patient has a legal guardian (ask how to send us the papers in advance)  Please tell us if you're pregnant--or if there's any chance you might be pregnant. Some surgeries may injure a fetus (unborn baby), so they require a pregnancy test. Surgeries that are safe for a fetus don't always need a test, and you can choose whether to have one.   If you have a child who's having surgery, please ask for a copy of Preparing for Your Child's Surgery.    Preparing for surgery    Within 30 days of surgery: Have a pre-op exam (sometimes called an H&P, or History and Physical). This can be done at a clinic or pre-operative center.  ? If you're having a , you may not need this exam. Talk to your care team.    At your pre-op exam, talk to your care team about all medicines you take. If you need to stop any medicines before surgery, ask when to start taking them again.  ? We do this for your safety. Many medicines can make you bleed too much during surgery. Some change how well surgery (anesthesia) drugs work.    Call your insurance company to let them know you're having surgery. (If you don't have insurance, call [phone].)    Call your clinic if there's any change in your health. This includes signs of a cold or flu (sore throat, runny nose, cough, rash, fever). It also includes a scrape or scratch near the surgery site.    If you have questions on the day of surgery, call your hospital or surgery center.  COVID testing  You may need to be tested for COVID-19 before having  surgery. If so, your surgical team will give you instructions for scheduling this test, separate from your preoperative history and physical.  Eating and drinking guidelines  For your safety: Unless your surgeon tells you otherwise, follow the guidelines below.    Eat and drink as usual until 8 hours before surgery. After that, no food or milk.    Drink clear liquids until 2 hours before surgery. These are liquids you can see through, like water, Gatorade and Propel Water. You may also have black coffee and tea (no cream or milk).    Nothing by mouth within 2 hours of surgery. This includes gum, candy and breath mints.    If you drink alcohol: Stop drinking it the night before surgery.    If your care team tells you to take medicine on the morning of surgery, it's okay to take it with a sip of water.  Preventing infection    Shower or bathe the night before and morning of your surgery. Follow the instructions your clinic gave you. (If no instructions, use regular soap.)    Don't shave or clip hair near your surgery site. We'll remove the hair if needed.    Don't smoke or vape the morning of surgery. You may chew nicotine gum up to 2 hours before surgery. A nicotine patch is okay.  ? Note: Some surgeries require you to completely quit smoking and nicotine. Check with your surgeon.    Your care team will make every effort to keep you safe from infection. We will:  ? Clean our hands often with soap and water (or an alcohol-based hand rub).  ? Clean the skin at your surgery site with a special soap that kills germs.  ? Give you a special gown to keep you warm. (Cold raises the risk of infection.)  ? Wear special hair covers, masks, gowns and gloves during surgery.  ? Give antibiotic medicine, if prescribed. Not all surgeries need antibiotics.  What to bring on the day of surgery    Photo ID and insurance card    Copy of your health care directive, if you have one    Glasses and hearing aides (bring cases)  ? You can't  wear contacts during surgery    Inhaler and eye drops, if you use them (tell us about these when you arrive)    CPAP machine or breathing device, if you use them    A few personal items, if spending the night    If you have . . .  ? A pacemaker, ICD (cardiac defibrillator) or other implant: Bring the ID card.  ? An implanted stimulator: Bring the remote control.  ? A legal guardian: Bring a copy of the certified (court-stamped) guardianship papers.  Please remove any jewelry, including body piercings. Leave jewelry and other valuables at home.  If you're going home the day of surgery    You must have a responsible adult drive you home. They should stay with you overnight as well.    If you don't have someone to stay with you, and you aren't safe to go home alone, we may keep you overnight. Insurance often won't pay for this.  After surgery  If it's hard to control your pain or you need more pain medicine, please call your surgeon's office.  Questions?   If you have any questions for your care team, list them here: _________________________________________________________________________________________________________________________________________________________________________ ____________________________________ ____________________________________ ____________________________________  For informational purposes only. Not to replace the advice of your health care provider. Copyright   2003, 2019 [facility]. All rights reserved. Clinically reviewed by Macy Mcnulty MD. [employer] [id number] - REV 07/21.      Current Outpatient Medications   Medication     amitriptyline (ELAVIL) 25 MG tablet  continue     atenolol (TENORMIN) 25 MG tablet  continue     atorvastatin (LIPITOR) 20 MG tablet  continue     Calcium-Vitamin D 600-200 MG-UNIT TABS  Stop taking and resume after surgery     levothyroxine (SYNTHROID/LEVOTHROID) 112 MCG tablet  continue     MULTIPLE VITAMINS PO  Stop taking and resume after surgery      pantoprazole (PROTONIX) 40 MG EC tablet  continue     traZODone (DESYREL) 50 MG tablet   continue     No current facility-administered medications for this visit.

## 2022-02-16 ENCOUNTER — ANESTHESIA EVENT (OUTPATIENT)
Dept: SURGERY | Facility: HOSPITAL | Age: 72
End: 2022-02-16
Payer: MEDICARE

## 2022-02-16 NOTE — ANESTHESIA PREPROCEDURE EVALUATION
Anesthesia Pre-Procedure Evaluation    Patient: Aide Galarza   MRN: [id number] : 1950        Preoperative Diagnosis: Left knee pain [M25.562]    Procedure : Procedure(s):  Left Knee Arthroscopy, partial Lateral Menisectomy and chondroplasty          Past Medical History:   Diagnosis Date     Dyspepsia      Hypertension      Hypothyroidism       Past Surgical History:   Procedure Laterality Date     caraitd Left     partial facial disection with malignant tumor- [facility]      COLONOSCOPY N/A 2016    Procedure: COLONOSCOPY;  Surgeon: Gaurav Santana DO;  Location: [facility]     COLONOSCOPY      normal- every 10 years- [facility]       No Known Allergies   Social History     Tobacco Use     Smoking status: Never Smoker     Smokeless tobacco: Never Used   Substance Use Topics     Alcohol use: Yes     Comment: daily- wine       Wt Readings from Last 1 Encounters:   22 72.8 kg (160 lb 9.6 oz)        Anesthesia Evaluation   Pt has had prior anesthetic.     History of anesthetic complications  - PONV.      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    Other:  - neg other ROS          Physical Exam    Airway  airway exam normal      Mallampati: II   TM distance: > 3 FB   Neck ROM: full   Mouth opening: > 3 cm    Respiratory Devices and Support         Dental  no notable dental history          Cardiovascular   cardiovascular exam normal       Rhythm and rate: regular and normal     Pulmonary   pulmonary exam normal        breath sounds clear to auscultation           OUTSIDE LABS:  CBC: No results found for: WBC, HGB, HCT, PLT  BMP:   Lab Results   Component Value Date     01/28/2022     12/10/2020    POTASSIUM 3.9 01/28/2022    POTASSIUM 4.1 12/10/2020    CHLORIDE 104 01/28/2022    CHLORIDE 105 12/10/2020    CO2 31 01/28/2022    CO2 32 12/10/2020    BUN 12 01/28/2022    BUN 17 12/10/2020    CR 0.82 01/28/2022    CR 0.84 12/10/2020     (H) 01/28/2022    GLC 87 12/10/2020     COAGS: No results found for: PTT, INR, FIBR  POC: No results found for: BGM, HCG, HCGS  HEPATIC: No results found for: ALBUMIN, PROTTOTAL, ALT, AST, GGT, ALKPHOS, BILITOTAL, BILIDIRECT, MOUNIKA  OTHER:   Lab Results   Component Value Date    ADRI 9.4 01/28/2022    TSH 2.76 01/28/2022       Anesthesia Plan    ASA Status:  3   NPO Status:  NPO Appropriate    Anesthesia Type: General.     - Airway: LMA   Induction: Propofol.   Maintenance: Balanced.        Consents    Anesthesia Plan(s) and associated risks, benefits, and realistic alternatives discussed. Questions answered and patient/representative(s) expressed understanding.     - Discussed: Risks, Benefits and Alternatives for BOTH SEDATION and the PROCEDURE were discussed     - Discussed with:  Patient      - Extended Intubation/Ventilatory Support Discussed: No.      - Patient is DNR/DNI Status: No    Use of blood products discussed: No .     Postoperative Care    Pain management: IV analgesics, Oral pain medications, Multi-modal analgesia.   PONV prophylaxis: Ondansetron (or other 5HT-3), Dexamethasone or Solumedrol, Scopolamine patch     Comments:                WILLIAM Nolasco CRNA

## 2022-02-18 ENCOUNTER — OFFICE VISIT (OUTPATIENT)
Dept: FAMILY MEDICINE | Facility: OTHER | Age: 72
End: 2022-02-18
Attending: NURSE PRACTITIONER
Payer: COMMERCIAL

## 2022-02-18 VITALS
BODY MASS INDEX: 26.63 KG/M2 | HEART RATE: 72 BPM | OXYGEN SATURATION: 100 % | RESPIRATION RATE: 20 BRPM | DIASTOLIC BLOOD PRESSURE: 78 MMHG | SYSTOLIC BLOOD PRESSURE: 130 MMHG | WEIGHT: 160 LBS | TEMPERATURE: 98 F

## 2022-02-18 DIAGNOSIS — M25.562 LEFT KNEE PAIN, UNSPECIFIED CHRONICITY: ICD-10-CM

## 2022-02-18 DIAGNOSIS — R73.9 HYPERGLYCEMIA: ICD-10-CM

## 2022-02-18 DIAGNOSIS — Z01.818 PREOP GENERAL PHYSICAL EXAM: Primary | ICD-10-CM

## 2022-02-18 DIAGNOSIS — F51.01 PRIMARY INSOMNIA: ICD-10-CM

## 2022-02-18 DIAGNOSIS — M76.892 TENDONITIS OF KNEE, LEFT: ICD-10-CM

## 2022-02-18 DIAGNOSIS — K21.9 GASTROESOPHAGEAL REFLUX DISEASE WITHOUT ESOPHAGITIS: ICD-10-CM

## 2022-02-18 DIAGNOSIS — E78.5 HYPERLIPIDEMIA, UNSPECIFIED HYPERLIPIDEMIA TYPE: ICD-10-CM

## 2022-02-18 DIAGNOSIS — S83.282D TEAR OF LATERAL CARTILAGE OR MENISCUS OF KNEE, CURRENT, LEFT, SUBSEQUENT ENCOUNTER: ICD-10-CM

## 2022-02-18 DIAGNOSIS — I10 ESSENTIAL HYPERTENSION: ICD-10-CM

## 2022-02-18 LAB
ALBUMIN SERPL-MCNC: 3.9 G/DL (ref 3.4–5)
ALP SERPL-CCNC: 50 U/L (ref 40–150)
ALT SERPL W P-5'-P-CCNC: 26 U/L (ref 0–50)
ANION GAP SERPL CALCULATED.3IONS-SCNC: 3 MMOL/L (ref 3–14)
AST SERPL W P-5'-P-CCNC: 17 U/L (ref 0–45)
BASOPHILS # BLD AUTO: 0 10E3/UL (ref 0–0.2)
BASOPHILS NFR BLD AUTO: 1 %
BILIRUB SERPL-MCNC: 0.6 MG/DL (ref 0.2–1.3)
BUN SERPL-MCNC: 13 MG/DL (ref 7–30)
CALCIUM SERPL-MCNC: 9.6 MG/DL (ref 8.5–10.1)
CHLORIDE BLD-SCNC: 106 MMOL/L (ref 94–109)
CHOLEST SERPL-MCNC: 163 MG/DL
CO2 SERPL-SCNC: 31 MMOL/L (ref 20–32)
CREAT SERPL-MCNC: 0.86 MG/DL (ref 0.52–1.04)
EOSINOPHIL # BLD AUTO: 0.1 10E3/UL (ref 0–0.7)
EOSINOPHIL NFR BLD AUTO: 2 %
ERYTHROCYTE [DISTWIDTH] IN BLOOD BY AUTOMATED COUNT: 13.1 % (ref 10–15)
EST. AVERAGE GLUCOSE BLD GHB EST-MCNC: 111 MG/DL
FASTING STATUS PATIENT QL REPORTED: YES
GFR SERPL CREATININE-BSD FRML MDRD: 73 ML/MIN/1.73M2
GLUCOSE BLD-MCNC: 97 MG/DL (ref 70–99)
HBA1C MFR BLD: 5.5 % (ref 0–5.6)
HCT VFR BLD AUTO: 43.7 % (ref 35–47)
HDLC SERPL-MCNC: 77 MG/DL
HGB BLD-MCNC: 14.2 G/DL (ref 11.7–15.7)
IMM GRANULOCYTES # BLD: 0 10E3/UL
IMM GRANULOCYTES NFR BLD: 0 %
LDLC SERPL CALC-MCNC: 73 MG/DL
LYMPHOCYTES # BLD AUTO: 1.6 10E3/UL (ref 0.8–5.3)
LYMPHOCYTES NFR BLD AUTO: 35 %
MCH RBC QN AUTO: 29 PG (ref 26.5–33)
MCHC RBC AUTO-ENTMCNC: 32.5 G/DL (ref 31.5–36.5)
MCV RBC AUTO: 89 FL (ref 78–100)
MONOCYTES # BLD AUTO: 0.5 10E3/UL (ref 0–1.3)
MONOCYTES NFR BLD AUTO: 11 %
NEUTROPHILS # BLD AUTO: 2.3 10E3/UL (ref 1.6–8.3)
NEUTROPHILS NFR BLD AUTO: 51 %
NONHDLC SERPL-MCNC: 86 MG/DL
NRBC # BLD AUTO: 0 10E3/UL
NRBC BLD AUTO-RTO: 0 /100
PLATELET # BLD AUTO: 223 10E3/UL (ref 150–450)
POTASSIUM BLD-SCNC: 3.9 MMOL/L (ref 3.4–5.3)
PROT SERPL-MCNC: 7.6 G/DL (ref 6.8–8.8)
RBC # BLD AUTO: 4.9 10E6/UL (ref 3.8–5.2)
SODIUM SERPL-SCNC: 140 MMOL/L (ref 133–144)
TRIGL SERPL-MCNC: 64 MG/DL
WBC # BLD AUTO: 4.5 10E3/UL (ref 4–11)

## 2022-02-18 PROCEDURE — 85004 AUTOMATED DIFF WBC COUNT: CPT | Mod: ZL | Performed by: NURSE PRACTITIONER

## 2022-02-18 PROCEDURE — 36415 COLL VENOUS BLD VENIPUNCTURE: CPT | Mod: ZL | Performed by: NURSE PRACTITIONER

## 2022-02-18 PROCEDURE — 83036 HEMOGLOBIN GLYCOSYLATED A1C: CPT | Mod: ZL | Performed by: NURSE PRACTITIONER

## 2022-02-18 PROCEDURE — 99207 PR NO CHARGE LOS: CPT | Performed by: NURSE PRACTITIONER

## 2022-02-18 PROCEDURE — G0463 HOSPITAL OUTPT CLINIC VISIT: HCPCS

## 2022-02-18 PROCEDURE — 80061 LIPID PANEL: CPT | Mod: ZL | Performed by: NURSE PRACTITIONER

## 2022-02-18 PROCEDURE — 93000 ELECTROCARDIOGRAM COMPLETE: CPT | Mod: 77 | Performed by: INTERNAL MEDICINE

## 2022-02-18 PROCEDURE — 80053 COMPREHEN METABOLIC PANEL: CPT | Mod: ZL | Performed by: NURSE PRACTITIONER

## 2022-02-18 PROCEDURE — 99214 OFFICE O/P EST MOD 30 MIN: CPT | Performed by: NURSE PRACTITIONER

## 2022-02-18 ASSESSMENT — PAIN SCALES - GENERAL: PAINLEVEL: NO PAIN (0)

## 2022-02-18 NOTE — NURSING NOTE
"Chief Complaint   Patient presents with     Pre-Op Exam       Initial /78 (BP Location: Left arm, Patient Position: Sitting, Cuff Size: Adult Regular)   Pulse 72   Temp 98  F (36.7  C) (Tympanic)   Resp 20   Wt 72.6 kg (160 lb)   LMP  (LMP Unknown)   SpO2 100%   BMI 26.63 kg/m   Estimated body mass index is 26.63 kg/m  as calculated from the following:    Height as of 5/6/19: 1.651 m (5' 5\").    Weight as of this encounter: 72.6 kg (160 lb).  Medication Reconciliation: complete  Xiomara Vásquez LPN    "

## 2022-02-20 ENCOUNTER — OFFICE VISIT (OUTPATIENT)
Dept: FAMILY MEDICINE | Facility: OTHER | Age: 72
End: 2022-02-20
Attending: ORTHOPAEDIC SURGERY
Payer: MEDICARE

## 2022-02-20 DIAGNOSIS — Z20.822 COVID-19 RULED OUT: Primary | ICD-10-CM

## 2022-02-20 PROCEDURE — U0003 INFECTIOUS AGENT DETECTION BY NUCLEIC ACID (DNA OR RNA); SEVERE ACUTE RESPIRATORY SYNDROME CORONAVIRUS 2 (SARS-COV-2) (CORONAVIRUS DISEASE [COVID-19]), AMPLIFIED PROBE TECHNIQUE, MAKING USE OF HIGH THROUGHPUT TECHNOLOGIES AS DESCRIBED BY CMS-2020-01-R: HCPCS | Mod: ZL

## 2022-02-21 LAB — SARS-COV-2 RNA RESP QL NAA+PROBE: NEGATIVE

## 2022-02-24 ENCOUNTER — HOSPITAL ENCOUNTER (OUTPATIENT)
Facility: HOSPITAL | Age: 72
Discharge: HOME | End: 2022-02-24
Attending: ORTHOPAEDIC SURGERY | Admitting: ORTHOPAEDIC SURGERY
Payer: MEDICARE

## 2022-02-24 ENCOUNTER — ANESTHESIA (OUTPATIENT)
Dept: SURGERY | Facility: HOSPITAL | Age: 72
End: 2022-02-24
Payer: MEDICARE

## 2022-02-24 VITALS
HEART RATE: 68 BPM | DIASTOLIC BLOOD PRESSURE: 72 MMHG | SYSTOLIC BLOOD PRESSURE: 117 MMHG | OXYGEN SATURATION: 95 % | RESPIRATION RATE: 16 BRPM | BODY MASS INDEX: 26.16 KG/M2 | WEIGHT: 157 LBS | TEMPERATURE: 97 F | HEIGHT: 65 IN

## 2022-02-24 DIAGNOSIS — G89.29 CHRONIC PAIN OF LEFT KNEE: Primary | ICD-10-CM

## 2022-02-24 DIAGNOSIS — M25.562 CHRONIC PAIN OF LEFT KNEE: Primary | ICD-10-CM

## 2022-02-24 PROCEDURE — 710N000010 HC RECOVERY PHASE 1, LEVEL 2, PER MIN: Performed by: ORTHOPAEDIC SURGERY

## 2022-02-24 PROCEDURE — 250N000013 HC RX MED GY IP 250 OP 250 PS 637: Performed by: ORTHOPAEDIC SURGERY

## 2022-02-24 PROCEDURE — 250N000011 HC RX IP 250 OP 636: Performed by: ORTHOPAEDIC SURGERY

## 2022-02-24 PROCEDURE — 250N000011 HC RX IP 250 OP 636: Performed by: NURSE ANESTHETIST, CERTIFIED REGISTERED

## 2022-02-24 PROCEDURE — 999N000141 HC STATISTIC PRE-PROCEDURE NURSING ASSESSMENT: Performed by: ORTHOPAEDIC SURGERY

## 2022-02-24 PROCEDURE — 29881 ARTHRS KNE SRG MNISECTMY M/L: CPT | Performed by: NURSE ANESTHETIST, CERTIFIED REGISTERED

## 2022-02-24 PROCEDURE — 710N000012 HC RECOVERY PHASE 2, PER MINUTE: Performed by: ORTHOPAEDIC SURGERY

## 2022-02-24 PROCEDURE — 250N000009 HC RX 250: Performed by: NURSE ANESTHETIST, CERTIFIED REGISTERED

## 2022-02-24 PROCEDURE — 29881 ARTHRS KNE SRG MNISECTMY M/L: CPT | Mod: LT | Performed by: ORTHOPAEDIC SURGERY

## 2022-02-24 PROCEDURE — 360N000076 HC SURGERY LEVEL 3, PER MIN: Performed by: ORTHOPAEDIC SURGERY

## 2022-02-24 PROCEDURE — 370N000017 HC ANESTHESIA TECHNICAL FEE, PER MIN: Performed by: ORTHOPAEDIC SURGERY

## 2022-02-24 PROCEDURE — 258N000003 HC RX IP 258 OP 636: Performed by: NURSE ANESTHETIST, CERTIFIED REGISTERED

## 2022-02-24 PROCEDURE — 272N000001 HC OR GENERAL SUPPLY STERILE: Performed by: ORTHOPAEDIC SURGERY

## 2022-02-24 RX ORDER — NALOXONE HYDROCHLORIDE 0.4 MG/ML
0.2 INJECTION, SOLUTION INTRAMUSCULAR; INTRAVENOUS; SUBCUTANEOUS
Status: DISCONTINUED | OUTPATIENT
Start: 2022-02-24 | End: 2022-02-24 | Stop reason: HOSPADM

## 2022-02-24 RX ORDER — ONDANSETRON 2 MG/ML
4 INJECTION INTRAMUSCULAR; INTRAVENOUS EVERY 30 MIN PRN
Status: DISCONTINUED | OUTPATIENT
Start: 2022-02-24 | End: 2022-02-24 | Stop reason: HOSPADM

## 2022-02-24 RX ORDER — ONDANSETRON 4 MG/1
4 TABLET, ORALLY DISINTEGRATING ORAL EVERY 30 MIN PRN
Status: DISCONTINUED | OUTPATIENT
Start: 2022-02-24 | End: 2022-02-24 | Stop reason: HOSPADM

## 2022-02-24 RX ORDER — LABETALOL 20 MG/4 ML (5 MG/ML) INTRAVENOUS SYRINGE
10
Status: DISCONTINUED | OUTPATIENT
Start: 2022-02-24 | End: 2022-02-24 | Stop reason: HOSPADM

## 2022-02-24 RX ORDER — ACETAMINOPHEN 325 MG/1
975 TABLET ORAL ONCE
Status: COMPLETED | OUTPATIENT
Start: 2022-02-24 | End: 2022-02-24

## 2022-02-24 RX ORDER — SODIUM CHLORIDE, SODIUM LACTATE, POTASSIUM CHLORIDE, CALCIUM CHLORIDE 600; 310; 30; 20 MG/100ML; MG/100ML; MG/100ML; MG/100ML
INJECTION, SOLUTION INTRAVENOUS CONTINUOUS
Status: DISCONTINUED | OUTPATIENT
Start: 2022-02-24 | End: 2022-02-24 | Stop reason: HOSPADM

## 2022-02-24 RX ORDER — FENTANYL CITRATE 50 UG/ML
25 INJECTION, SOLUTION INTRAMUSCULAR; INTRAVENOUS
Status: DISCONTINUED | OUTPATIENT
Start: 2022-02-24 | End: 2022-02-24 | Stop reason: HOSPADM

## 2022-02-24 RX ORDER — BUPIVACAINE HYDROCHLORIDE 2.5 MG/ML
INJECTION, SOLUTION INFILTRATION; PERINEURAL PRN
Status: DISCONTINUED | OUTPATIENT
Start: 2022-02-24 | End: 2022-02-24 | Stop reason: HOSPADM

## 2022-02-24 RX ORDER — HYDROCODONE BITARTRATE AND ACETAMINOPHEN 5; 325 MG/1; MG/1
1 TABLET ORAL
Status: COMPLETED | OUTPATIENT
Start: 2022-02-24 | End: 2022-02-24

## 2022-02-24 RX ORDER — DEXAMETHASONE SODIUM PHOSPHATE 10 MG/ML
INJECTION, SOLUTION INTRAMUSCULAR; INTRAVENOUS PRN
Status: DISCONTINUED | OUTPATIENT
Start: 2022-02-24 | End: 2022-02-24

## 2022-02-24 RX ORDER — ONDANSETRON 2 MG/ML
INJECTION INTRAMUSCULAR; INTRAVENOUS PRN
Status: DISCONTINUED | OUTPATIENT
Start: 2022-02-24 | End: 2022-02-24

## 2022-02-24 RX ORDER — LIDOCAINE 40 MG/G
CREAM TOPICAL
Status: DISCONTINUED | OUTPATIENT
Start: 2022-02-24 | End: 2022-02-24 | Stop reason: HOSPADM

## 2022-02-24 RX ORDER — SCOLOPAMINE TRANSDERMAL SYSTEM 1 MG/1
1 PATCH, EXTENDED RELEASE TRANSDERMAL ONCE
Status: DISCONTINUED | OUTPATIENT
Start: 2022-02-24 | End: 2022-02-24 | Stop reason: HOSPADM

## 2022-02-24 RX ORDER — ALBUTEROL SULFATE 0.83 MG/ML
2.5 SOLUTION RESPIRATORY (INHALATION) EVERY 4 HOURS PRN
Status: DISCONTINUED | OUTPATIENT
Start: 2022-02-24 | End: 2022-02-24 | Stop reason: HOSPADM

## 2022-02-24 RX ORDER — PROPOFOL 10 MG/ML
INJECTION, EMULSION INTRAVENOUS CONTINUOUS PRN
Status: DISCONTINUED | OUTPATIENT
Start: 2022-02-24 | End: 2022-02-24

## 2022-02-24 RX ORDER — CEFAZOLIN SODIUM 2 G/100ML
2 INJECTION, SOLUTION INTRAVENOUS SEE ADMIN INSTRUCTIONS
Status: DISCONTINUED | OUTPATIENT
Start: 2022-02-24 | End: 2022-02-24 | Stop reason: HOSPADM

## 2022-02-24 RX ORDER — CEFAZOLIN SODIUM 2 G/100ML
2 INJECTION, SOLUTION INTRAVENOUS
Status: COMPLETED | OUTPATIENT
Start: 2022-02-24 | End: 2022-02-24

## 2022-02-24 RX ORDER — MEPERIDINE HYDROCHLORIDE 25 MG/ML
12.5 INJECTION INTRAMUSCULAR; INTRAVENOUS; SUBCUTANEOUS
Status: DISCONTINUED | OUTPATIENT
Start: 2022-02-24 | End: 2022-02-24 | Stop reason: HOSPADM

## 2022-02-24 RX ORDER — NALOXONE HYDROCHLORIDE 0.4 MG/ML
0.4 INJECTION, SOLUTION INTRAMUSCULAR; INTRAVENOUS; SUBCUTANEOUS
Status: DISCONTINUED | OUTPATIENT
Start: 2022-02-24 | End: 2022-02-24 | Stop reason: HOSPADM

## 2022-02-24 RX ORDER — LIDOCAINE HYDROCHLORIDE 20 MG/ML
INJECTION, SOLUTION INFILTRATION; PERINEURAL PRN
Status: DISCONTINUED | OUTPATIENT
Start: 2022-02-24 | End: 2022-02-24

## 2022-02-24 RX ORDER — PROPOFOL 10 MG/ML
INJECTION, EMULSION INTRAVENOUS PRN
Status: DISCONTINUED | OUTPATIENT
Start: 2022-02-24 | End: 2022-02-24

## 2022-02-24 RX ORDER — EPHEDRINE SULFATE 50 MG/ML
INJECTION, SOLUTION INTRAMUSCULAR; INTRAVENOUS; SUBCUTANEOUS PRN
Status: DISCONTINUED | OUTPATIENT
Start: 2022-02-24 | End: 2022-02-24

## 2022-02-24 RX ORDER — FENTANYL CITRATE 50 UG/ML
INJECTION, SOLUTION INTRAMUSCULAR; INTRAVENOUS PRN
Status: DISCONTINUED | OUTPATIENT
Start: 2022-02-24 | End: 2022-02-24

## 2022-02-24 RX ORDER — KETOROLAC TROMETHAMINE 30 MG/ML
INJECTION, SOLUTION INTRAMUSCULAR; INTRAVENOUS PRN
Status: DISCONTINUED | OUTPATIENT
Start: 2022-02-24 | End: 2022-02-24

## 2022-02-24 RX ORDER — FENTANYL CITRATE 50 UG/ML
50 INJECTION, SOLUTION INTRAMUSCULAR; INTRAVENOUS EVERY 5 MIN PRN
Status: DISCONTINUED | OUTPATIENT
Start: 2022-02-24 | End: 2022-02-24 | Stop reason: HOSPADM

## 2022-02-24 RX ORDER — HYDRALAZINE HYDROCHLORIDE 20 MG/ML
2.5-5 INJECTION INTRAMUSCULAR; INTRAVENOUS EVERY 10 MIN PRN
Status: DISCONTINUED | OUTPATIENT
Start: 2022-02-24 | End: 2022-02-24 | Stop reason: HOSPADM

## 2022-02-24 RX ORDER — TRAMADOL HYDROCHLORIDE 50 MG/1
50 TABLET ORAL EVERY 6 HOURS PRN
Qty: 10 TABLET | Refills: 0 | Status: SHIPPED | OUTPATIENT
Start: 2022-02-24 | End: 2022-02-27

## 2022-02-24 RX ORDER — HYDROMORPHONE HYDROCHLORIDE 1 MG/ML
0.5 INJECTION, SOLUTION INTRAMUSCULAR; INTRAVENOUS; SUBCUTANEOUS EVERY 5 MIN PRN
Status: DISCONTINUED | OUTPATIENT
Start: 2022-02-24 | End: 2022-02-24 | Stop reason: HOSPADM

## 2022-02-24 RX ADMIN — PROPOFOL 75 MCG/KG/MIN: 10 INJECTION, EMULSION INTRAVENOUS at 07:43

## 2022-02-24 RX ADMIN — KETOROLAC TROMETHAMINE 15 MG: 30 INJECTION, SOLUTION INTRAMUSCULAR at 08:17

## 2022-02-24 RX ADMIN — FENTANYL CITRATE 25 MCG: 50 INJECTION, SOLUTION INTRAMUSCULAR; INTRAVENOUS at 07:52

## 2022-02-24 RX ADMIN — HYDROCODONE BITARTRATE AND ACETAMINOPHEN 1 TABLET: 5; 325 TABLET ORAL at 09:26

## 2022-02-24 RX ADMIN — FENTANYL CITRATE 25 MCG: 50 INJECTION, SOLUTION INTRAMUSCULAR; INTRAVENOUS at 07:46

## 2022-02-24 RX ADMIN — ACETAMINOPHEN 975 MG: 325 TABLET, FILM COATED ORAL at 07:24

## 2022-02-24 RX ADMIN — ONDANSETRON 4 MG: 2 INJECTION INTRAMUSCULAR; INTRAVENOUS at 08:15

## 2022-02-24 RX ADMIN — PROPOFOL 200 MG: 10 INJECTION, EMULSION INTRAVENOUS at 07:36

## 2022-02-24 RX ADMIN — SCOPALAMINE 1 PATCH: 1 PATCH, EXTENDED RELEASE TRANSDERMAL at 07:24

## 2022-02-24 RX ADMIN — CEFAZOLIN SODIUM 2 G: 2 INJECTION, SOLUTION INTRAVENOUS at 07:28

## 2022-02-24 RX ADMIN — DEXAMETHASONE SODIUM PHOSPHATE 10 MG: 10 INJECTION, SOLUTION INTRAMUSCULAR; INTRAVENOUS at 07:44

## 2022-02-24 RX ADMIN — Medication 10 MG: at 07:55

## 2022-02-24 RX ADMIN — FENTANYL CITRATE 25 MCG: 50 INJECTION, SOLUTION INTRAMUSCULAR; INTRAVENOUS at 07:48

## 2022-02-24 RX ADMIN — FENTANYL CITRATE 25 MCG: 50 INJECTION, SOLUTION INTRAMUSCULAR; INTRAVENOUS at 07:59

## 2022-02-24 RX ADMIN — MIDAZOLAM 2 MG: 1 INJECTION INTRAMUSCULAR; INTRAVENOUS at 07:28

## 2022-02-24 RX ADMIN — LIDOCAINE HYDROCHLORIDE 80 MG: 20 INJECTION, SOLUTION INFILTRATION; PERINEURAL at 07:36

## 2022-02-24 RX ADMIN — SODIUM CHLORIDE, POTASSIUM CHLORIDE, SODIUM LACTATE AND CALCIUM CHLORIDE: 600; 310; 30; 20 INJECTION, SOLUTION INTRAVENOUS at 07:26

## 2022-02-24 NOTE — BRIEF OP NOTE
[facility]    Brief Operative Note    Pre-operative diagnosis: Left knee pain [M25.562]  Post-operative diagnosis Same as pre-operative diagnosis    Procedure: Procedure(s):  Left Knee Arthroscopy, partial Lateral Menisectomy and chondroplasty  Surgeon: Surgeon(s) and Role:     * Gokul Nava MD - Primary     * Imtiaz Marie PA - Assisting  Anesthesia: General   Estimated Blood Loss: Less than 10 ml    Drains: None  Specimens: * No specimens in log *  Findings:   None.  Complications: None.  Implants: * No implants in log *

## 2022-02-24 NOTE — LETTER
Aide AVILA Geovanni  [address]  [facility] [address]      SURGERY PACKET            Your surgery is scheduled:    Date: 02/24/22  ________________________________    Time: We will call 02/23 afternoon with your admit time.  ________________________________    Please arrive at:  [facility] Admitting North Entrance.  ______________________    Surgeon's Name: Dr. Nava  _______________________        Pre-Op Physical Fax Numbers:          Pre-Admissions  [phone]        Your surgery is located at:  [address] [facility] [address]         Before Your Surgery  For Patients and Visitors at [place]    Welcome  As you get ready for surgery, you may have a lot of questions.  This brochure will help you know what to expect before and after surgery.  You and your family are the most important members of your health care team.  You will need to take an active role in your care.    Be sure to ask questions and learn all that you can about your surgery.  If you have any safety concerns, we urge you to tell a nurse as soon as possible.   This brochure is for information only.  It does not replace the advice of your doctor.  Always follow your doctor's advice.    Please tell us if you need a .    GETTING READY FOR SURGERY  Always follow your surgeon's instructions.  If you don't, your surgery could be canceled.  Please use the following checklist.    Within 30 Days of Surgery:    Have a pre-surgery physical exam with your family doctor or partner.    If you use a [facility], all of your information from the pre-op physical will be in the [employer] computer system.    Ask the doctor to send all of your results to the hospital before the surgery.  The doctor also may ask you to bring the results with you on the day of surgery or you can fax them to [phone].  Tell the doctor if:    You are allergic to latex or rubber  (Latex and rubber gloves are  often used in medical care).    You are taking any medicines (including aspirin), vitamins (Vitamin E, Fish Oil, Omegas) or herbal products.  You will need to stop taking some medicines before surgery.    You have any medical problems (allergies, diabetes or heart disease, for example).    You have a pacemaker or an AICD (automatic implanted cardiac defibrillator).  If you do, please bring the ID card with you on the day of surgery.    You are a smoker.  People who smoke have a higher risk of infection after surgery.  Ask your doctor how you can quit smoking.  Within 7 days of Surgery:  Prior to your surgical procedure, a nurse will be contacting you to obtain a health history. A nurse will call you within a few days of surgery to go over these and other instructions.  If you do not hear from them, please call them at [phone].        Call your insurance company.  Ask if you need pre-approval for your surgery.  If you do not have insurance, please let us know.      Arrange for someone to drive you home after surgery.  If you will have same-day surgery, you may not drive or take public transportation home by yourself.    Arrange for someone to stay with you for 24 hours after you go home.  This person must be a responsible adult (ie- Family member or friend).  The Day Before Surgery:     Call your surgeon if there are any changes in your health.  This includes signs of a cold or flu (such as a sore throat, runny nose, cough, rash or fever).    Do not smoke, drink alcohol or take over-the-counter medicine (unless your surgeon tells you to) for 24 hours before and after surgery.    If you take prescribed drugs:  You may need to stop them until after the surgery.  Follow your doctor's orders.  You may resume Aspirin and/or blood thinners after your surgery as directed by your physician/surgeon.    NO FOOD OR DRINK AFTER MIDNIGHT.  Follow your surgeon's orders for eating and drinking.  You need to have an empty  stomach before surgery.  This will make the surgery as safe as possible.  If you don't follow your doctor's orders, your surgery could be changed to another date.    The Day of Surgery:    Take a shower or bath the night before and the morning of surgery.  Use antiseptic soap or the soap your surgeon gave you.  Gently clean the skin.  Do not shave or scrub your surgery site.    Please remove deodorant, cologne, scented lotion, makeup, nail polish and jewelry (including rings and body piercings).  If you wear artificial nails, please remove at least one nail before coming to the hospital.    Wear clean, loose clothing to the hospital.    Bring these items to the hospital:  1. Your insurance card.  2. A list of all the medicines you take.  Include vitamins, minerals, herbs and over-the-counter drugs.  Note any drug allergies.  3. A copy of your advance health care directive, if you have one.  This tells us what treatment you would want -- and who would make health care decisions -- if you could no longer speak for yourself.  You may request this form in advance or download it from [email or web address].  4. A case for any glasses, contact lenses, hearing aids or dentures.  5. Your inhaler or CPAP machine, if you use these at home.  Leave extra cash, jewelry and other valuables at home.    When You Arrive:  When you get to the hospital, you will:    Check in.  If you are under age 18, you must be with a parent or legal guardian.    Electronically sign consent forms, if you haven't already.  These electronic forms state that you know the risks and benefits of surgery.  When you sign the forms, you give us permission to do the surgery.  Do not sign them unless you understand what will happen during and after your surgery.  If you have any questions about your surgery, ask to speak with your doctor before you sign the forms.  If you don't understand the answers, ask again.    Receive a copy of the Patients Bill of  "Rights.  If you do not receive a copy, please ask for one.    Change into hospital clothes.  Your belongings will be placed in a bag.  We will return them to you after surgery.    Meet with the anesthesia provider.  He or she will tell you what kind of anesthesia (medicine) will be used to keep you comfortable during surgery.  Remember: It's okay to remind doctors and nurses to wash their hands before touching you.   In most cases, your surgeon will use a marker to write his or her initials on the surgery site.  This ensures that the exact site is operated on.  For safety reasons, we will ask you the same questions many times.  For example, we may ask your name and birth date over and over again.  Friends and family can stay with you until it's time for surgery.  While you're in surgery, they will be in the waiting area.  Please note that cell phones are not allowed in some patient care areas.  If you have questions about what will happen in the operating room, talk to your care team.  After Surgery:    We will move you to a recovery room where we will watch you closely.  If you have any pain or discomfort, tell your nurse.  He or she will try to make you comfortable.      If you are staying overnight we will move you to your hospital room after you are awake.    If you are going home we will move you to another room.  Friends and family may be able to join you.  The length of time you spend in recovery depends on the type of medicine you received, your medical condition, and the type of surgery you had.  Dealing with pain:  A nurse will check your comfort level often during your stay.  He or she will work with you to manage your pain.  Remember:    All pain is real.  There are many ways to control pain.  We can help you decide what works best for you.    Ask for pain medicine when you need it.  Don't try to \"tough it out\" -- this can make you feel worse.  Always take your medicine as ordered.    Medicine doesn't " work the same for everyone.  If your medicine isn't working tell your nurse.  There may be other medicines or treatments we can try.  Going Home:  We will let you know when you're ready to leave the hospital.  Before you leave, we will tell you how to care for yourself at home and prevent infections.  If you do not understand something, please say so.  We will answer any questions you have.  We will then help you get ready to leave.  You must have an adult with you for the first 24 hours after you leave the hospital. Take it easy when you get home.  You will need some time to recover -- you may be more tired than you realize at first.  Rest and relax for at least the first 24 hours at home.  You'll feel better and heal faster if you take good care of yourself.                      Pre-Operative Surgery Scrub    Purpose:   The skin harbors a large variety of bacteria, both infectious and noninfectious.  Showering with an antiseptic soap prior to an invasive procedure will decrease the number of transient and resident (good and bad) bacteria that is normally found on the skin.    Procedure:      Shower with 1 bottle of antiseptic soap (enclosed) the evening before and 1 bottle the morning of surgery (bathing the day of the procedure is most important).       Apply the soap at full strength (use the entire bottle).  Gently clean the skin, rinse, and dry with a clean towel that is freshly laundered (out of the dryer) and then put on clean clothing that is freshly laundered.        We have given you information regarding pre-op showering.  We recommend that patients wash with an antiseptic soap prior to surgery.  This cleanser will be given to you at the clinic or mailed to your home.  It is advised that you liberally wash the specific area surgery area the night before, and again in the morning before the surgery.  Do not apply lotion afterward.  We would like to keep the skin as clean as possible.    Thank you for  following these important instructions.      You have been scheduled for surgery and we would like to give you some information that will assist in helping get the best possible outcome.      Before Surgery:   If for any reason you decide not to have the surgery, please contact our office.  We can easily cancel or reschedule the procedure. Please call the  at [phone] or after hours [phone]      Any pain related to the surgery that occurs before the surgery needs to be reported and managed by your primary care or referring doctor.      Please keep in mind that the time of surgery is subject to change.  Make sure you have nothing to eat or drink after midnight.  If your surgery is later in the afternoon, this recommendation might change, but not until the day before surgery after the actual time of the surgery has been established.    After Surgery:  When you are discharged from the recovery room, the nurses will review instructions with you and your caregiver.      Please wash your hands every time you touch the wound or change bandages or dressings.      Do not submerge the wound in water.  You may not use a bathtub or hot tub until the wound is closed.  The wait time frame is generally 2-3 weeks but any open area can be a source of incoming bacteria, so it is better to be on the safe side and avoid the tub until your wound is fully healed.      You may take a shower 24 hours after surgery.  Double check with your surgeon if it is ok for water to run over a wound, whether it has been sutured, stapled, glued or is open.  You may gently wash the wound using the antiseptic soap provided for your pre-surgery showering (do not use a washcloth).  Any mild soap will work as well.      Many surgical wounds will have small white strips of tape on them called Steri Strips.  Do not remove these.  The edges will curl and fall off within 7-10 days with normal showering.      If you are going home with sutures  (stitches) or staples, you must return to the clinic to have them taken out, usually within 1-2 weeks.      Signs and symptoms of infection include:  1. Fever, temperature over 101.5 ' F  2. Redness  3. Swelling  4. Increasing pain  5. Green or yellow drainage which may or may not have a foul odor.    Symptoms of infection need to be reported to your surgery office. Please call your Surgeon.      If you or your  are deaf or hard of hearing, or prefer a language other than English, please let us know.  We have many free services, including interpreters and other aids to help you communicate. You may ask for help  through any member of your care team or by calling Language Services at [phone], option 2.

## 2022-02-24 NOTE — INTERVAL H&P NOTE
Brief History of Illness:   Aide Galarza is a 71 year old female who was admitted for left knee pain    H&P reviewed and patient examined with no new updates from prior exam

## 2022-02-24 NOTE — ANESTHESIA PROCEDURE NOTES
Airway       Patient location during procedure: OR       Procedure Start/Stop Times: 2/24/2022 7:36 AM and 2/24/2022 7:39 AM  Staff -        CRNA: Adam Domingo APRN CRNA       Performed By: CRNAIndications and Patient Condition       Indications for airway management: anshul-procedural       Induction type:intravenous       Mask difficulty assessment: 0 - not attempted    Final Airway Details       Final airway type: supraglottic airway    Supraglottic Airway Details        Type: LMA       Brand: I-Gel       LMA size: 4    Post intubation assessment        Placement verified by: capnometry and chest rise        Number of attempts at approach: 1       Number of other approaches attempted: 0       Secured with: plastic tape       Ease of procedure: easy       Dentition: Intact and Unchanged

## 2022-02-24 NOTE — OR NURSING
I have given crutches to the patient, adjusted them and provided complete instructions on safe use.

## 2022-02-24 NOTE — ANESTHESIA POSTPROCEDURE EVALUATION
Patient: Aide Galarza    Procedure: Procedure(s):  Left Knee Arthroscopy, partial Lateral Menisectomy and chondroplasty       Anesthesia Type:  General    Note:  Disposition: Outpatient   Postop Pain Control: Uneventful            Sign Out: Well controlled pain   PONV: No   Neuro/Psych: Uneventful            Sign Out: Acceptable/Baseline neuro status   Airway/Respiratory: Uneventful            Sign Out: Acceptable/Baseline resp. status   CV/Hemodynamics: Uneventful            Sign Out: Acceptable CV status; No obvious hypovolemia; No obvious fluid overload   Other NRE: NONE   DID A NON-ROUTINE EVENT OCCUR? No           Last vitals:  Vitals Value Taken Time   /73 02/24/22 0905   Temp 97.1  F (36.2  C) 02/24/22 0905   Pulse 59 02/24/22 0906   Resp 19 02/24/22 0906   SpO2 93 % 02/24/22 0906   Vitals shown include unvalidated device data.    Electronically Signed By: WILLIAM Figueroa CRNA  February 24, 2022  10:39 AM

## 2022-02-24 NOTE — OR NURSING
Patient and responsible adult given discharge instructions with no questions regarding instructions. Sagar score 19/20. Pain level 3/10.  Discharged from unit via wheelchair. Patient discharged to home.

## 2022-02-24 NOTE — ANESTHESIA CARE TRANSFER NOTE
Patient: Aide Galarza    Procedure: Procedure(s):  Left Knee Arthroscopy, partial Lateral Menisectomy and chondroplasty       Diagnosis: Left knee pain [M25.562]  Diagnosis Additional Information: No value filed.    Anesthesia Type:   General     Note:    Oropharynx: oropharynx clear of all foreign objects and spontaneously breathing  Level of Consciousness: drowsy  Oxygen Supplementation: room air    Independent Airway: airway patency satisfactory and stable  Dentition: dentition unchanged  Vital Signs Stable: post-procedure vital signs reviewed and stable  Report to RN Given: handoff report given  Patient transferred to: PACU    Handoff Report: Identifed the Patient, Identified the Reponsible Provider, Reviewed the pertinent medical history, Discussed the surgical course, Reviewed Intra-OP anesthesia mangement and issues during anesthesia, Set expectations for post-procedure period and Allowed opportunity for questions and acknowledgement of understanding      Vitals:  Vitals Value Taken Time   /78 02/24/22 0845   Temp     Pulse 67 02/24/22 0848   Resp 14 02/24/22 0848   SpO2 96 % 02/24/22 0848   Vitals shown include unvalidated device data.    Electronically Signed By: WILLIAM Norwood CRNA  February 24, 2022  8:48 AM

## 2022-02-24 NOTE — DISCHARGE INSTRUCTIONS
POST OPERATIVE PATIENT INFORMATION  Knee Arthroscopy    DIET  No restrictions.  Drink plenty of fluids.  If you become nauseated, stay as quiet as possible and try fluids such as tea, soup, or lemon-lime pop.  DISCOMFORT  You may experience some discomfort.  To reduce discomfort, try the following:    Keep leg elevated on pillows as much as possible for 2-3 days to reduce swelling (swelling increases pain).    Apply an ice pack to your knee for 3 days after surgery to help reduce swelling, and periodically for the first 1-2 weeks post operatively.    Take aspirin, Ascriptin, Tylenol or the prescription you may have received.  ACTIVITY  It is not unusual to feel tired and weak the day of surgery.  Resting quietly is recommended, keeping the operative leg elevated.  You may put weight on your operative leg as tolerated using the crutches for balance, unless otherwise instructed.  Perform the knee exercises as instructed on the bottom of this sheet.  CARE OF THE OPERATIVE SITE  Keep the elastic wrap in place and keep the bandage dry with these exceptions:    REMOVE ACE BANDAGE AND DRESSINGS ON THE SECOND DAY FOLLOWING SURGERY.    YOU MAY TAKE A SHOWER AFTER DRESSINGS ARE REMOVED.  (NO TUB BATHS OR HOT TUBS)  IMPORTANT OBSERVATIONS  Check C-M-S of operative leg every 4 hours while you are awake for the first 48 hours:    C: color - should appear normal/pink    M: motion - able to move foot, wiggle toes    S: sensation - able to  feel : no numbness, tingling  If you experience changes in C-M-S and/or severe pain, you may remove the elastic bandages and reapply it - tight enough to provide support for the gauze dressing but loose enough to improve C-M-S.  The gauze dressing should NOT be removed when rewrapping the elastic bandage.    KNEE EXERCISES  Start leg exercises two days post-op (see below).  These exercises help maintain muscle tone in your leg and strengthen the muscles supporting the knee.  Do them a  minimum of three times a day; ten times each.  If you have questions about the exercises or problems doing them please contact your doctor.  Quad Sets. Lying on your back, press the back side of your knee down against the bed, tightening the muscles on the top of your thigh.  Hold for a count of five.  Relax and repeat.  Hamstring Isometrics.  Push your heel into the bed as if attempting to bend your knee.  You should feel the muscles on the back of your thigh tighten.  Hold for a count of five.  Relax and repeat.  CONTACT YOUR DOCTOR if you have any problems, such as:    Fever of 100 degrees or higher (a fever below 100 degrees may occur as a normal response to the surgical procedure).    Changes in C-M-S and/or pain, unrelieved by pain medication or rewrapping the elastic bandage.    Pain or tenderness in the calf of either leg.    If you have any questions, please call your surgeon or your local Hospital Emergency Room.          KNEE EXERCISES      Quadricep sets: Tighten your front thigh muscles (quadriceps), pressing your knee toward the floor.  Hold for 5 to 10 seconds; then relax.        Heel slides: Bend your knee and slide your heel toward your hip as far as you can.  Hold for 5 seconds.  Slide back down until your knee touches the floor.        Leg lifts: Lift your leg 8 to 12 inches, keeping your knee straight.  Hold for 5 seconds.  Lower your leg slowly back to the ground.    Post-Anesthesia Patient Instructions    IMMEDIATELY FOLLOWING SURGERY:  Do not drive or operate machinery for the first twenty four hours after surgery.  Do not make any important decisions for twenty four hours after surgery or while taking narcotic pain medications or sedatives.  If you develop intractable nausea and vomiting or a severe headache please notify your doctor immediately.    FOLLOW-UP:  Please make an appointment with your surgeon as instructed. You do not need to follow up with anesthesia unless specifically  instructed to do so.    WOUND CARE INSTRUCTIONS (if applicable):  Keep a dry clean dressing on the anesthesia/puncture wound site if there is drainage.  Once the wound has quit draining you may leave it open to air.  Generally you should leave the bandage intact for twenty four hours unless there is drainage.  If the epidural site drains for more than 36-48 hours please call the anesthesia department.    QUESTIONS?:  Please feel free to call your physician or the hospital  if you have any questions, and they will be happy to assist you.       POST OPERATIVE PATIENT INFORMATION  Knee Arthroscopy    DIET  No restrictions.  Drink plenty of fluids.  If you become nauseated, stay as quiet as possible and try fluids such as tea, soup, or lemon-lime pop.  DISCOMFORT  You may experience some discomfort.  To reduce discomfort, try the following:    Keep leg elevated on pillows as much as possible for 2-3 days to reduce swelling (swelling increases pain).    Apply an ice pack to your knee for 3 days after surgery to help reduce swelling, and periodically for the first 1-2 weeks post operatively.    Take aspirin, Ascriptin, Tylenol or the prescription you may have received.  ACTIVITY  It is not unusual to feel tired and weak the day of surgery.  Resting quietly is recommended, keeping the operative leg elevated.  You may put weight on your operative leg as tolerated using the crutches for balance, unless otherwise instructed.  Perform the knee exercises as instructed on the bottom of this sheet.  CARE OF THE OPERATIVE SITE  Keep the elastic wrap in place and keep the bandage dry with these exceptions:    REMOVE ACE BANDAGE AND DRESSINGS ON THE SECOND DAY FOLLOWING SURGERY.    YOU MAY TAKE A SHOWER AFTER DRESSINGS ARE REMOVED.  (NO TUB BATHS OR HOT TUBS)    CLEANSE THE INCISIONS WITH HYDROGEN PEROXIDE.  THEN COVER INCISIONS WITH BAND-AIDS.    AFTER DRESSING REMOVAL WEAR A THIGH HIGH SUKH SOCK DURING THE DAY FOR ONE WEEK,  THEN AS NEEDED.  IMPORTANT OBSERVATIONS  Check C-M-S of operative leg every 4 hours while you are awake for the first 48 hours:    C: color - should appear normal/pink    M: motion - able to move foot, wiggle toes    S: sensation - able to  feel : no numbness, tingling  If you experience changes in C-M-S and/or severe pain, you may remove the elastic bandages and reapply it - tight enough to provide support for the gauze dressing but loose enough to improve C-M-S.  The gauze dressing should NOT be removed when rewrapping the elastic bandage.    KNEE EXERCISES  Start leg exercises two days post-op (see below).  These exercises help maintain muscle tone in your leg and strengthen the muscles supporting the knee.  Do them a minimum of three times a day; ten times each.  If you have questions about the exercises or problems doing them please contact your doctor.  Quad Sets. Lying on your back, press the back side of your knee down against the bed, tightening the muscles on the top of your thigh.  Hold for a count of five.  Relax and repeat.  Hamstring Isometrics.  Push your heel into the bed as if attempting to bend your knee.  You should feel the muscles on the back of your thigh tighten.  Hold for a count of five.  Relax and repeat.  CONTACT YOUR DOCTOR if you have any problems, such as:    Fever of 100 degrees or higher (a fever below 100 degrees may occur as a normal response to the surgical procedure).    Changes in C-M-S and/or pain, unrelieved by pain medication or rewrapping the elastic bandage.    Pain or tenderness in the calf of either leg.    If you have any questions, please call your surgeon or your local Hospital Emergency Room.          KNEE EXERCISES      Quadricep sets: Tighten your front thigh muscles (quadriceps), pressing your knee toward the floor.  Hold for 5 to 10 seconds; then relax.        Heel slides: Bend your knee and slide your heel toward your hip as far as you can.  Hold for 5 seconds.   Slide back down until your knee touches the floor.        Leg lifts: Lift your leg 8 to 12 inches, keeping your knee straight.  Hold for 5 seconds.  Lower your leg slowly back to the ground.    Post-Anesthesia Patient Instructions    IMMEDIATELY FOLLOWING SURGERY:  Do not drive or operate machinery for the first twenty four hours after surgery.  Do not make any important decisions for twenty four hours after surgery or while taking narcotic pain medications or sedatives.  If you develop intractable nausea and vomiting or a severe headache please notify your doctor immediately.    FOLLOW-UP:  Please make an appointment with your surgeon as instructed. You do not need to follow up with anesthesia unless specifically instructed to do so.    WOUND CARE INSTRUCTIONS (if applicable):  Keep a dry clean dressing on the anesthesia/puncture wound site if there is drainage.  Once the wound has quit draining you may leave it open to air.  Generally you should leave the bandage intact for twenty four hours unless there is drainage.  If the epidural site drains for more than 36-48 hours please call the anesthesia department.    QUESTIONS?:  Please feel free to call your physician or the hospital  if you have any questions, and they will be happy to assist you.

## 2022-03-04 ENCOUNTER — TRANSFERRED RECORDS (OUTPATIENT)
Dept: HEALTH INFORMATION MANAGEMENT | Facility: CLINIC | Age: 72
End: 2022-03-04

## 2022-03-16 NOTE — PROGRESS NOTES
"  Assessment & Plan     Cough  Suspected 2019 novel coronavirus infection  Acute URI  Patient with URI symptoms times 2 weeks. Does not appear in distress. Oxygen sats 98 percent. She denies fevers, but continues to have a cough. I therefore do not feel she needs antibiotics, but will treat with steroid burst. Possible reactive airway. Will also order tessalon pearls and albuterol inhaler to help with the cough.    Will return with new or worsening symptoms.     - predniSONE (DELTASONE) 20 MG tablet; Take 2 tablets (40 mg) by mouth daily for 5 days  - albuterol (PROAIR HFA/PROVENTIL HFA/VENTOLIN HFA) 108 (90 Base) MCG/ACT inhaler; Inhale 2 puffs into the lungs every 6 hours  - benzonatate (TESSALON) 200 MG capsule; Take 1 capsule (200 mg) by mouth 3 times daily as needed for cough       BMI:   Estimated body mass index is 26.29 kg/m  as calculated from the following:    Height as of this encounter: 1.651 m (5' 5\").    Weight as of this encounter: 71.7 kg (158 lb).         No follow-ups on file.    Tiny Issa NP  [facility] - [place] is a 71 year old who presents for the following health issues    HPI     Acute Illness  Acute illness concerns: patient has had a productive cough since 3/4/22, feels it is improving slightly, had left knee arthroscopy on 2/24/22  Onset/Duration:  2 weeks   Symptoms:  Fever: no  Chills/Sweats: no   Headache (location?): no  Sinus Pressure: no  Conjunctivitis:  no  Ear Pain: no  Rhinorrhea: YES  Congestion: YES , chest   Sore Throat: no  Cough: YES-productive of green sputum  Wheeze: YES  Decreased Appetite: no  Nausea: no  Vomiting: no  Diarrhea: no  Dysuria/Freq.: no  Dysuria or Hematuria: no  Fatigue/Achiness: YES , fatigue after coughing spells   Sick/Strep Exposure: no  Therapies tried and outcome: cough drops, dayquil and nyquil   She does not smoke.   No history of asthma or COPD.     Review of Systems   Constitutional, HEENT, " "cardiovascular, pulmonary, gi and gu systems are negative, except as otherwise noted.      Objective    /84 (BP Location: Right arm, Patient Position: Chair, Cuff Size: Adult Regular)   Pulse 83   Temp 96.8  F (36  C) (Tympanic)   Ht 1.651 m (5' 5\")   Wt 71.7 kg (158 lb)   LMP  (LMP Unknown)   SpO2 98%   BMI 26.29 kg/m    Body mass index is 26.29 kg/m .  Physical Exam   GENERAL: healthy, alert and no distress  EYES: Eyes grossly normal to inspection, PERRL and conjunctivae and sclerae normal  HENT: ear canals and TM's normal, nose and mouth without ulcers or lesions  NECK: no adenopathy, no asymmetry, masses, or scars and thyroid normal to palpation  RESP: lungs clear to auscultation - no rales, rhonchi or wheezes  CV: regular rate and rhythm, normal S1 S2, no S3 or S4, no murmur, click or rub, no peripheral edema  NEURO: Normal strength and tone, mentation intact and speech normal  PSYCH: mentation appears normal, affect normal/bright    Results for orders placed or performed in visit on 03/17/22 (from the past 24 hour(s))   CBC with platelets and differential    Narrative    The following orders were created for panel order CBC with platelets and differential.  Procedure                               Abnormality         Status                     ---------                               -----------         ------                     CBC with platelets and d...[[id number]]  Abnormal            Final result                 Please view results for these tests on the individual orders.   CRP, inflammation   Result Value Ref Range    CRP Inflammation 4.4 0.0 - 8.0 mg/L   CBC with platelets and differential   Result Value Ref Range    WBC Count 5.5 4.0 - 11.0 10e3/uL    RBC Count 4.80 3.80 - 5.20 10e6/uL    Hemoglobin 13.9 11.7 - 15.7 g/dL    Hematocrit 44.8 35.0 - 47.0 %    MCV 93 78 - 100 fL    MCH 29.0 26.5 - 33.0 pg    MCHC 31.0 (L) 31.5 - 36.5 g/dL    RDW 12.9 10.0 - 15.0 %    Platelet Count 237 150 - " 450 10e3/uL    % Neutrophils 59 %    % Lymphocytes 28 %    % Monocytes 11 %    % Eosinophils 2 %    % Basophils 0 %    % Immature Granulocytes 0 %    NRBCs per 100 WBC 0 <1 /100    Absolute Neutrophils 3.2 1.6 - 8.3 10e3/uL    Absolute Lymphocytes 1.5 0.8 - 5.3 10e3/uL    Absolute Monocytes 0.6 0.0 - 1.3 10e3/uL    Absolute Eosinophils 0.1 0.0 - 0.7 10e3/uL    Absolute Basophils 0.0 0.0 - 0.2 10e3/uL    Absolute Immature Granulocytes 0.0 <=0.4 10e3/uL    Absolute NRBCs 0.0 10e3/uL   XR Chest 2 Views    Narrative    PROCEDURE:  XR CHEST 2 VW    HISTORY:  Cough.     COMPARISON:  None.    FINDINGS:   The cardiac silhouette is normal in size. The pulmonary vasculature is  normal.  The lungs are clear. No pleural effusion or pneumothorax.      Impression    IMPRESSION:  No acute cardiopulmonary disease.      IAN GUERRERO MD         SYSTEM ID:  [id number]

## 2022-03-17 ENCOUNTER — OFFICE VISIT (OUTPATIENT)
Dept: FAMILY MEDICINE | Facility: OTHER | Age: 72
End: 2022-03-17
Attending: NURSE PRACTITIONER
Payer: MEDICARE

## 2022-03-17 ENCOUNTER — ANCILLARY PROCEDURE (OUTPATIENT)
Dept: GENERAL RADIOLOGY | Facility: OTHER | Age: 72
End: 2022-03-17
Attending: NURSE PRACTITIONER
Payer: MEDICARE

## 2022-03-17 VITALS
HEIGHT: 65 IN | DIASTOLIC BLOOD PRESSURE: 84 MMHG | BODY MASS INDEX: 26.33 KG/M2 | TEMPERATURE: 96.8 F | WEIGHT: 158 LBS | SYSTOLIC BLOOD PRESSURE: 130 MMHG | HEART RATE: 83 BPM | OXYGEN SATURATION: 98 %

## 2022-03-17 DIAGNOSIS — Z20.822 SUSPECTED 2019 NOVEL CORONAVIRUS INFECTION: ICD-10-CM

## 2022-03-17 DIAGNOSIS — J06.9 ACUTE URI: ICD-10-CM

## 2022-03-17 DIAGNOSIS — R05.9 COUGH: Primary | ICD-10-CM

## 2022-03-17 LAB
BASOPHILS # BLD AUTO: 0 10E3/UL (ref 0–0.2)
BASOPHILS NFR BLD AUTO: 0 %
CRP SERPL-MCNC: 4.4 MG/L (ref 0–8)
EOSINOPHIL # BLD AUTO: 0.1 10E3/UL (ref 0–0.7)
EOSINOPHIL NFR BLD AUTO: 2 %
ERYTHROCYTE [DISTWIDTH] IN BLOOD BY AUTOMATED COUNT: 12.9 % (ref 10–15)
FLUAV RNA SPEC QL NAA+PROBE: NEGATIVE
FLUBV RNA RESP QL NAA+PROBE: NEGATIVE
HCT VFR BLD AUTO: 44.8 % (ref 35–47)
HGB BLD-MCNC: 13.9 G/DL (ref 11.7–15.7)
IMM GRANULOCYTES # BLD: 0 10E3/UL
IMM GRANULOCYTES NFR BLD: 0 %
LYMPHOCYTES # BLD AUTO: 1.5 10E3/UL (ref 0.8–5.3)
LYMPHOCYTES NFR BLD AUTO: 28 %
MCH RBC QN AUTO: 29 PG (ref 26.5–33)
MCHC RBC AUTO-ENTMCNC: 31 G/DL (ref 31.5–36.5)
MCV RBC AUTO: 93 FL (ref 78–100)
MONOCYTES # BLD AUTO: 0.6 10E3/UL (ref 0–1.3)
MONOCYTES NFR BLD AUTO: 11 %
NEUTROPHILS # BLD AUTO: 3.2 10E3/UL (ref 1.6–8.3)
NEUTROPHILS NFR BLD AUTO: 59 %
NRBC # BLD AUTO: 0 10E3/UL
NRBC BLD AUTO-RTO: 0 /100
PLATELET # BLD AUTO: 237 10E3/UL (ref 150–450)
RBC # BLD AUTO: 4.8 10E6/UL (ref 3.8–5.2)
RSV RNA SPEC NAA+PROBE: NEGATIVE
SARS-COV-2 RNA RESP QL NAA+PROBE: NEGATIVE
WBC # BLD AUTO: 5.5 10E3/UL (ref 4–11)

## 2022-03-17 PROCEDURE — G0463 HOSPITAL OUTPT CLINIC VISIT: HCPCS | Mod: 25 | Performed by: NURSE PRACTITIONER

## 2022-03-17 PROCEDURE — 99214 OFFICE O/P EST MOD 30 MIN: CPT | Performed by: NURSE PRACTITIONER

## 2022-03-17 PROCEDURE — 85004 AUTOMATED DIFF WBC COUNT: CPT | Mod: ZL | Performed by: NURSE PRACTITIONER

## 2022-03-17 PROCEDURE — 36415 COLL VENOUS BLD VENIPUNCTURE: CPT | Mod: ZL | Performed by: NURSE PRACTITIONER

## 2022-03-17 PROCEDURE — 86140 C-REACTIVE PROTEIN: CPT | Mod: ZL | Performed by: NURSE PRACTITIONER

## 2022-03-17 PROCEDURE — 87637 SARSCOV2&INF A&B&RSV AMP PRB: CPT | Mod: ZL | Performed by: NURSE PRACTITIONER

## 2022-03-17 PROCEDURE — 71046 X-RAY EXAM CHEST 2 VIEWS: CPT | Mod: TC

## 2022-03-17 RX ORDER — BENZONATATE 200 MG/1
200 CAPSULE ORAL 3 TIMES DAILY PRN
Qty: 30 CAPSULE | Refills: 0 | Status: SHIPPED | OUTPATIENT
Start: 2022-03-17 | End: 2023-01-30

## 2022-03-17 RX ORDER — ALBUTEROL SULFATE 90 UG/1
2 AEROSOL, METERED RESPIRATORY (INHALATION) EVERY 6 HOURS
Qty: 18 G | Refills: 0 | Status: SHIPPED | OUTPATIENT
Start: 2022-03-17 | End: 2023-01-30

## 2022-03-17 RX ORDER — PREDNISONE 20 MG/1
40 TABLET ORAL DAILY
Qty: 10 TABLET | Refills: 0 | Status: SHIPPED | OUTPATIENT
Start: 2022-03-17 | End: 2022-03-22

## 2022-03-17 ASSESSMENT — PAIN SCALES - GENERAL: PAINLEVEL: NO PAIN (0)

## 2022-03-17 NOTE — NURSING NOTE
"Chief Complaint   Patient presents with     Cough     2 weeks        Initial LMP  (LMP Unknown)  Estimated body mass index is 26.13 kg/m  as calculated from the following:    Height as of 2/24/22: 1.651 m (5' 5\").    Weight as of 2/24/22: 71.2 kg (157 lb).  Medication Reconciliation: complete  Diana Calabrese LPN  "

## 2022-09-04 ENCOUNTER — HEALTH MAINTENANCE LETTER (OUTPATIENT)
Age: 72
End: 2022-09-04

## 2022-10-07 DIAGNOSIS — I10 ESSENTIAL HYPERTENSION: ICD-10-CM

## 2022-10-07 DIAGNOSIS — K21.9 GASTROESOPHAGEAL REFLUX DISEASE WITHOUT ESOPHAGITIS: ICD-10-CM

## 2022-10-07 DIAGNOSIS — E03.9 HYPOTHYROIDISM, UNSPECIFIED TYPE: ICD-10-CM

## 2022-10-07 DIAGNOSIS — F51.01 PRIMARY INSOMNIA: ICD-10-CM

## 2022-10-07 RX ORDER — TRAZODONE HYDROCHLORIDE 50 MG/1
TABLET, FILM COATED ORAL
Qty: 90 TABLET | Refills: 2 | Status: SHIPPED | OUTPATIENT
Start: 2022-10-07 | End: 2023-07-07

## 2022-10-07 RX ORDER — PANTOPRAZOLE SODIUM 40 MG/1
TABLET, DELAYED RELEASE ORAL
Qty: 90 TABLET | Refills: 2 | Status: SHIPPED | OUTPATIENT
Start: 2022-10-07 | End: 2023-07-07

## 2022-10-07 RX ORDER — LEVOTHYROXINE SODIUM 112 UG/1
TABLET ORAL
Qty: 90 TABLET | Refills: 2 | Status: SHIPPED | OUTPATIENT
Start: 2022-10-07 | End: 2023-07-07

## 2022-10-07 RX ORDER — ATENOLOL 25 MG/1
TABLET ORAL
Qty: 90 TABLET | Refills: 2 | Status: SHIPPED | OUTPATIENT
Start: 2022-10-07 | End: 2023-07-07

## 2022-10-07 NOTE — TELEPHONE ENCOUNTER
amitriptyline      Last Written Prescription Date:  1/10/22  Last Fill Quantity: 90,   # refills: 2  Last Office Visit: 3/17/22  Future Office visit:       Routing refill request to provider for review/approval because:      levothyroxine      Last Written Prescription Date:  1/10/22  Last Fill Quantity: 90,   # refills: 2  Last Office Visit: 3/17/22  Future Office visit:       Routing refill request to provider for review/approval because:      atenolol      Last Written Prescription Date:  1/10/22  Last Fill Quantity: 90,   # refills: 2  Last Office Visit: 3/17/22  Future Office visit:       Routing refill request to provider for review/approval because:      pantoprazole      Last Written Prescription Date:  1/10/22  Last Fill Quantity: 90,   # refills: 2  Last Office Visit: 3/17/22  Future Office visit:       Routing refill request to provider for review/approval because:      traZODone      Last Written Prescription Date:  1/10/22  Last Fill Quantity: 90,   # refills: 2  Last Office Visit: 3/17/22  Future Office visit:       Routing refill request to provider for review/approval because:

## 2023-01-06 DIAGNOSIS — E78.5 HYPERLIPIDEMIA, UNSPECIFIED HYPERLIPIDEMIA TYPE: ICD-10-CM

## 2023-01-06 RX ORDER — ATORVASTATIN CALCIUM 20 MG/1
20 TABLET, FILM COATED ORAL DAILY
Qty: 90 TABLET | Refills: 3 | Status: SHIPPED | OUTPATIENT
Start: 2023-01-06 | End: 2024-01-03

## 2023-01-06 NOTE — TELEPHONE ENCOUNTER
Atorvastatin 20 mg      Last Written Prescription Date:  1-28-22  Last Fill Quantity: 90,   # refills: 3  Last Office Visit: 3-17-22  Future Office visit:    Next 5 appointments (look out 90 days)    Jan 30, 2023  7:40 AM  (Arrive by 7:25 AM)  PHYSICAL with Tiny Issa NP  [facility] - [place] ([facility] - [place] ) [address]  [place] [address]  [phone]

## 2023-01-23 ASSESSMENT — ENCOUNTER SYMPTOMS
NERVOUS/ANXIOUS: 1
EYE PAIN: 0
NAUSEA: 0
DIARRHEA: 0
CONSTIPATION: 0
JOINT SWELLING: 1
HEMATOCHEZIA: 0
ARTHRALGIAS: 0
CHILLS: 0
DIZZINESS: 0
FREQUENCY: 0
HEARTBURN: 1
FEVER: 0
HEADACHES: 0
DYSURIA: 0
MYALGIAS: 1
COUGH: 0
PARESTHESIAS: 1
WEAKNESS: 0
ABDOMINAL PAIN: 0
HEMATURIA: 0
SORE THROAT: 0
PALPITATIONS: 0
BREAST MASS: 0

## 2023-01-23 ASSESSMENT — PATIENT HEALTH QUESTIONNAIRE - PHQ9
SUM OF ALL RESPONSES TO PHQ QUESTIONS 1-9: 5
10. IF YOU CHECKED OFF ANY PROBLEMS, HOW DIFFICULT HAVE THESE PROBLEMS MADE IT FOR YOU TO DO YOUR WORK, TAKE CARE OF THINGS AT HOME, OR GET ALONG WITH OTHER PEOPLE: NOT DIFFICULT AT ALL
SUM OF ALL RESPONSES TO PHQ QUESTIONS 1-9: 5

## 2023-01-23 ASSESSMENT — ACTIVITIES OF DAILY LIVING (ADL): CURRENT_FUNCTION: NO ASSISTANCE NEEDED

## 2023-01-25 ASSESSMENT — ENCOUNTER SYMPTOMS
DYSURIA: 0
HEMATOCHEZIA: 0
CONSTIPATION: 0
DIARRHEA: 0
HEARTBURN: 1
COUGH: 0
EYE PAIN: 0
CHILLS: 0
WEAKNESS: 0
ARTHRALGIAS: 0
JOINT SWELLING: 1
MYALGIAS: 1
NERVOUS/ANXIOUS: 1
PARESTHESIAS: 1
DIZZINESS: 0
ABDOMINAL PAIN: 0
BREAST MASS: 0
HEMATURIA: 0
SORE THROAT: 0
HEADACHES: 0
PALPITATIONS: 0
FREQUENCY: 0
NAUSEA: 0
FEVER: 0

## 2023-01-25 ASSESSMENT — ACTIVITIES OF DAILY LIVING (ADL): CURRENT_FUNCTION: NO ASSISTANCE NEEDED

## 2023-01-26 NOTE — PROGRESS NOTES
"   SUBJECTIVE:   CC: Aide is an 72 year old who presents for preventive health visit.     Patient has been advised of split billing requirements and indicates understanding: Yes     Healthy Habits:     In general, how would you rate your overall health?  Good    Frequency of exercise:  4-5 days/week    Duration of exercise:  15-30 minutes    Do you usually eat at least 4 servings of fruit and vegetables a day, include whole grains    & fiber and avoid regularly eating high fat or \"junk\" foods?  Yes    Taking medications regularly:  Yes    Medication side effects:  None    Ability to successfully perform activities of daily living:  No assistance needed    Home Safety:  No safety concerns identified    Hearing Impairment:  Difficulty understanding soft or whispered speech    In the past 6 months, have you been bothered by leaking of urine?  No    In general, how would you rate your overall mental or emotional health?  Poor      PHQ-2 Total Score: 3    Additional concerns today:  No    LEFT KNEE Pain-continues to have left knee pain, present for years. She did have her lateral meniscus repaired in 2022, but the pain never went away. Started using CBD cream and feels this has helped. She never did PT. Going up and down stairs increases the pain. No erythema or fevers.     Functional dyspepsia. Switched to protonix with minimal relief and then amitriptyline was added. Has complete improvement. Would like to continue both. Consumes a healthy diet and avoids trigger foods. No melena. No nausea or vomiting. No abdominal pain.     She tried to get off trazodone, but had significant insomnia, so she went back on it and has been doing well. Would like to continue. Since son , more trouble falling asleep. Feels this is situational.     Hypothyroidism: Weight up slightly and she has not changed her eating habits. Mild constipation-controlled. Otherwise denies skin/hair/nail changes, temperature intolerance, or heart " palpitations. Taking Synthroid daily.      HTN: Not checking BP at home. Denies CP, palpitations, SOB, edema, vision changes, headaches. Currently taking atenolol, does not want to change to metoprolol as her pharmacy continues to get the atenolol. Watches sodium intake. Exercises daily-walking 1 mile daily.     HYPERLIPIDEMIA-tolerating statin. No side effects.      Social History     Tobacco Use     Smoking status: Never     Smokeless tobacco: Never   Substance Use Topics     Alcohol use: Yes     Comment: daily- wine      Drug use: No     PHQ 5/6/2019 1/23/2023   PHQ-9 Total Score 0 5   Q9: Thoughts of better off dead/self-harm past 2 weeks Not at all Not at all     WOJCIECH-7 SCORE 5/6/2019   Total Score 0     Last PHQ-9 1/23/2023   1.  Little interest or pleasure in doing things 1   2.  Feeling down, depressed, or hopeless 1   3.  Trouble falling or staying asleep, or sleeping too much 1   4.  Feeling tired or having little energy 1   5.  Poor appetite or overeating 0   6.  Feeling bad about yourself 0   7.  Trouble concentrating 1   8.  Moving slowly or restless 0   Q9: Thoughts of better off dead/self-harm past 2 weeks 0   PHQ-9 Total Score 5     WOJCIECH-7  5/6/2019   1. Feeling nervous, anxious, or on edge 0   2. Not being able to stop or control worrying 0   3. Worrying too much about different things 0   4. Trouble relaxing 0   5. Being so restless that it is hard to sit still 0   6. Becoming easily annoyed or irritable 0   7. Feeling afraid, as if something awful might happen 0   WOJCIECH-7 Total Score 0           Today's PHQ-2 Score:   PHQ-2 ( 1999 [employer]) 1/23/2023   Q1: Little interest or pleasure in doing things 2   Q2: Feeling down, depressed or hopeless 1   PHQ-2 Score 3   PHQ-2 Total Score (12-17 Years)- Positive if 3 or more points; Administer PHQ-A if positive -   Q1: Little interest or pleasure in doing things More than half the days   Q2: Feeling down, depressed or hopeless Several days   PHQ-2 Score 3            Social History     Tobacco Use     Smoking status: Never     Smokeless tobacco: Never   Substance Use Topics     Alcohol use: Yes     Comment: daily- wine          Alcohol Use 1/23/2023   Prescreen: >3 drinks/day or >7 drinks/week? No   Prescreen: >3 drinks/day or >7 drinks/week? -       Reviewed orders with patient.  Reviewed health maintenance and updated orders accordingly - Yes  BP Readings from Last 3 Encounters:   01/30/23 124/80   03/17/22 130/84   02/24/22 117/72    Wt Readings from Last 3 Encounters:   01/30/23 73.5 kg (162 lb)   03/17/22 71.7 kg (158 lb)   02/24/22 71.2 kg (157 lb)                  Patient Active Problem List   Diagnosis     Encounter for general adult medical examination without abnormal findings     Essential hypertension     Gastroesophageal reflux disease     Hypothyroidism     Insomnia     Osteopenia     Postmenopausal status     H/O colonoscopy     [place] cardiac risk 6% in next 10 years     Tendonitis of knee, left     Hyperlipidemia, unspecified hyperlipidemia type     Past Surgical History:   Procedure Laterality Date     ARTHROSCOPY KNEE Left 02/24/2022    Procedure: Left Knee Arthroscopy, partial Lateral Menisectomy and chondroplasty;  Surgeon: Gokul Nava MD;  Location: [facility]     BIOPSY  2021    Skin biopsy     caraitd Left 2011    partial facial disection with malignant tumor- [facility]      COLONOSCOPY N/A 09/30/2016    Procedure: COLONOSCOPY;  Surgeon: Gaurav Santana DO;  Location: [facility]     COLONOSCOPY      normal- every 10 years- [facility]      ENT SURGERY  9/5/11    Parotid gland removed;facial dissection       Social History     Tobacco Use     Smoking status: Never     Smokeless tobacco: Never   Substance Use Topics     Alcohol use: Yes     Comment: daily- wine      Family History   Problem Relation Age of Onset     Hypertension Mother         Vascular dementia     Osteoporosis Mother      Obesity Mother      Parkinsonism Father       Hypertension Father      Thyroid Disease Father         Parkinson s     Hypothyroidism Sister      Hypothyroidism Sister      Hypothyroidism Sister      Hypothyroidism Sister      Osteoporosis Sister      Thyroid Disease Sister      Thyroid Disease Sister      Thyroid Disease Sister      Thyroid Disease Sister          Current Outpatient Medications   Medication Sig Dispense Refill     amitriptyline (ELAVIL) 25 MG tablet TAKE ONE TABLET BY MOUTH DAILY NIGHTLY AT BEDTIME 90 tablet 2     atenolol (TENORMIN) 25 MG tablet TAKE ONE TABLET BY MOUTH DAILY 90 tablet 2     atorvastatin (LIPITOR) 20 MG tablet TAKE 1 TABLET (20 MG) BY MOUTH DAILY 90 tablet 3     Calcium-Vitamin D 600-200 MG-UNIT TABS Take 1 tablet by mouth daily       levothyroxine (SYNTHROID/LEVOTHROID) 112 MCG tablet TAKE 1 TABLET BY MOUTH 90 tablet 2     MULTIPLE VITAMINS PO Take 1 tablet by mouth daily       pantoprazole (PROTONIX) 40 MG EC tablet TAKE 1 TABLET BY MOUTH EVERY MORNING BEFORE BREAKFAST. DO NOT CRUSH. 90 tablet 2     traZODone (DESYREL) 50 MG tablet TAKE ONE TABLET BY MOUTH DAILY NIGHTLY AT BEDTIME 90 tablet 2       Breast Cancer Screening: mammogram scheduled.         History of abnormal Pap smear: not needed-72 years old     Reviewed and updated as needed this visit by clinical staff   Tobacco  Allergies  Meds              Reviewed and updated as needed this visit by Provider                 Past Medical History:   Diagnosis Date     Dyspepsia      Hypertension      Hypothyroidism       Past Surgical History:   Procedure Laterality Date     ARTHROSCOPY KNEE Left 02/24/2022    Procedure: Left Knee Arthroscopy, partial Lateral Menisectomy and chondroplasty;  Surgeon: Gokul Nava MD;  Location: [facility]     BIOPSY  2021    Skin biopsy     caraitd Left 2011    partial facial disection with malignant tumor- [facility]      COLONOSCOPY N/A 09/30/2016    Procedure: COLONOSCOPY;  Surgeon: Gaurav Santana DO;  Location: [facility]  "    COLONOSCOPY      normal- every 10 years- [facility]      ENT SURGERY  9/5/11    Parotid gland removed;facial dissection       Review of Systems   Constitutional: Negative for chills and fever.   HENT: Negative for congestion, ear pain, hearing loss and sore throat.    Eyes: Negative for pain.   Respiratory: Negative for cough.    Cardiovascular: Negative for chest pain, palpitations and peripheral edema.   Gastrointestinal: Positive for heartburn. Negative for abdominal pain, constipation, diarrhea, hematochezia and nausea.   Breasts:  Negative for tenderness, breast mass and discharge.   Genitourinary: Negative for dysuria, frequency, genital sores, hematuria, pelvic pain, urgency, vaginal bleeding and vaginal discharge.   Musculoskeletal: Positive for joint swelling and myalgias. Negative for arthralgias.   Skin: Negative for rash.   Neurological: Positive for paresthesias. Negative for dizziness, weakness and headaches.   Psychiatric/Behavioral: Positive for mood changes. The patient is nervous/anxious.           OBJECTIVE:   /80 (BP Location: Left arm, Patient Position: Sitting, Cuff Size: Adult Regular)   Pulse 71   Temp 97.3  F (36.3  C) (Tympanic)   Ht 1.651 m (5' 5\")   Wt 73.5 kg (162 lb)   LMP  (LMP Unknown)   SpO2 97%   BMI 26.96 kg/m    Physical Exam  GENERAL: alert and tearful when talking about her son  EYES: Eyes grossly normal to inspection, PERRL and conjunctivae and sclerae normal  HENT: ear canals and TM's normal, nose and mouth without ulcers or lesions  NECK: no adenopathy, no asymmetry, masses, or scars and thyroid normal to palpation  RESP: lungs clear to auscultation - no rales, rhonchi or wheezes  BREAST: deferred, mammogram scheduled for tomorrow  CV: regular rate and rhythm, soft systolic murmur heard, no peripheral edema  ABDOMEN: soft, nontender, no hepatosplenomegaly, no masses and bowel sounds normal   (female): deferred  MS: no gross musculoskeletal defects " noted, no edema        LEFT KNEE-Skin intact. No erythema, edema, or ecchymosis. Some pain with palpation along lateral distal joint line. No pain with flexion or extension.   SKIN: no suspicious lesions or rashes  NEURO: Normal strength and tone, mentation intact and speech normal  PSYCH: mentation appears normal, affect normal/bright    Results for orders placed or performed in visit on 01/30/23   CBC with platelets and differential     Status: None   Result Value Ref Range    WBC Count 5.3 4.0 - 11.0 10e3/uL    RBC Count 5.11 3.80 - 5.20 10e6/uL    Hemoglobin 14.8 11.7 - 15.7 g/dL    Hematocrit 45.4 35.0 - 47.0 %    MCV 89 78 - 100 fL    MCH 29.0 26.5 - 33.0 pg    MCHC 32.6 31.5 - 36.5 g/dL    RDW 13.0 10.0 - 15.0 %    Platelet Count 207 150 - 450 10e3/uL    % Neutrophils 54 %    % Lymphocytes 32 %    % Monocytes 11 %    % Eosinophils 2 %    % Basophils 1 %    % Immature Granulocytes 0 %    NRBCs per 100 WBC 0 <1 /100    Absolute Neutrophils 2.8 1.6 - 8.3 10e3/uL    Absolute Lymphocytes 1.7 0.8 - 5.3 10e3/uL    Absolute Monocytes 0.6 0.0 - 1.3 10e3/uL    Absolute Eosinophils 0.1 0.0 - 0.7 10e3/uL    Absolute Basophils 0.0 0.0 - 0.2 10e3/uL    Absolute Immature Granulocytes 0.0 <=0.4 10e3/uL    Absolute NRBCs 0.0 10e3/uL   CBC with platelets and differential     Status: None    Narrative    The following orders were created for panel order CBC with platelets and differential.  Procedure                               Abnormality         Status                     ---------                               -----------         ------                     CBC with platelets and d...[[id number]]                      Final result                 Please view results for these tests on the individual orders.     Other labs pending.    ASSESSMENT/PLAN:   (Z00.00) Health maintenance examination  (primary encounter diagnosis)  Plan: Labs pending. No longer needs a pap smear. Mammogram scheduled. Dexa-scan ordered. Colonoscopy  "UTD.     (E03.9) Hypothyroidism, unspecified type  Plan: TSH with free T4 reflex        Will notify patient of the results when available and intervene accordingly.       (K21.9) Gastroesophageal reflux disease without esophagitis  Comment: controlled  Plan: continue current medications    (F51.01) Primary insomnia  Comment: controlled  Plan: Continue trazodone.     (I10) Essential hypertension  Plan: Well controlled. Continue current medications. Encouraged daily exercise and a low sodium diet. Recommended checking BP's 2x/wk, call the clinic if consistantly s>140 or d>90. Follow up in 12 months.     (Z13.0) Screening, anemia, deficiency, iron  Plan: CBC with platelets and differential        Will notify patient of the results when available and intervene accordingly.     (M25.562,  G89.29) Chronic pain of left knee  Comment: no red flags  Plan: Physical Therapy Referral        Will refer to PT. If this does not help, will refer back to Dr. Nava.     (Z78.0) Asymptomatic postmenopausal estrogen deficiency  Plan: DX Hip/Pelvis/Spine        Will notify patient of the results when available and intervene accordingly.     (E78.5) Hyperlipidemia, unspecified hyperlipidemia type  Plan: Lipid Profile (Chol, Trig, HDL, LDL calc)        Will notify patient of the results when available and intervene accordingly. If controlled, will continue Lipitor.     (R01.1) Heart murmur  Comment: very soft  Plan: Echo ordered. Will notify patient of the results when available and intervene accordingly.         Patient has been advised of split billing requirements and indicates understanding: Yes      COUNSELING:  Reviewed preventive health counseling, as reflected in patient instructions       Regular exercise       Healthy diet/nutrition       Vision screening       Hearing screening      BMI:   Estimated body mass index is 26.96 kg/m  as calculated from the following:    Height as of this encounter: 1.651 m (5' 5\").    Weight as of " this encounter: 73.5 kg (162 lb).         She reports that she has never smoked. She has never used smokeless tobacco.      Tiny Issa NP  [facility] [place]

## 2023-01-30 ENCOUNTER — OFFICE VISIT (OUTPATIENT)
Dept: FAMILY MEDICINE | Facility: OTHER | Age: 73
End: 2023-01-30
Attending: NURSE PRACTITIONER
Payer: COMMERCIAL

## 2023-01-30 VITALS
TEMPERATURE: 97.3 F | DIASTOLIC BLOOD PRESSURE: 80 MMHG | HEIGHT: 65 IN | BODY MASS INDEX: 26.99 KG/M2 | HEART RATE: 71 BPM | SYSTOLIC BLOOD PRESSURE: 124 MMHG | WEIGHT: 162 LBS | OXYGEN SATURATION: 97 %

## 2023-01-30 DIAGNOSIS — M25.562 CHRONIC PAIN OF LEFT KNEE: ICD-10-CM

## 2023-01-30 DIAGNOSIS — K21.9 GASTROESOPHAGEAL REFLUX DISEASE WITHOUT ESOPHAGITIS: ICD-10-CM

## 2023-01-30 DIAGNOSIS — Z00.00 HEALTH MAINTENANCE EXAMINATION: Primary | ICD-10-CM

## 2023-01-30 DIAGNOSIS — G89.29 CHRONIC PAIN OF LEFT KNEE: ICD-10-CM

## 2023-01-30 DIAGNOSIS — I10 ESSENTIAL HYPERTENSION: ICD-10-CM

## 2023-01-30 DIAGNOSIS — R01.1 HEART MURMUR: ICD-10-CM

## 2023-01-30 DIAGNOSIS — Z78.0 ASYMPTOMATIC POSTMENOPAUSAL ESTROGEN DEFICIENCY: ICD-10-CM

## 2023-01-30 DIAGNOSIS — F51.01 PRIMARY INSOMNIA: ICD-10-CM

## 2023-01-30 DIAGNOSIS — E03.9 HYPOTHYROIDISM, UNSPECIFIED TYPE: ICD-10-CM

## 2023-01-30 DIAGNOSIS — E78.5 HYPERLIPIDEMIA, UNSPECIFIED HYPERLIPIDEMIA TYPE: ICD-10-CM

## 2023-01-30 DIAGNOSIS — Z13.0 SCREENING, ANEMIA, DEFICIENCY, IRON: ICD-10-CM

## 2023-01-30 LAB
ALBUMIN SERPL BCG-MCNC: 4.2 G/DL (ref 3.5–5.2)
ALP SERPL-CCNC: 68 U/L (ref 35–104)
ALT SERPL W P-5'-P-CCNC: 17 U/L (ref 10–35)
ANION GAP SERPL CALCULATED.3IONS-SCNC: 8 MMOL/L (ref 7–15)
AST SERPL W P-5'-P-CCNC: 19 U/L (ref 10–35)
BASOPHILS # BLD AUTO: 0 10E3/UL (ref 0–0.2)
BASOPHILS NFR BLD AUTO: 1 %
BILIRUB SERPL-MCNC: 0.5 MG/DL
BUN SERPL-MCNC: 13.9 MG/DL (ref 8–23)
CALCIUM SERPL-MCNC: 9.5 MG/DL (ref 8.8–10.2)
CHLORIDE SERPL-SCNC: 104 MMOL/L (ref 98–107)
CHOLEST SERPL-MCNC: 163 MG/DL
CREAT SERPL-MCNC: 0.88 MG/DL (ref 0.51–0.95)
DEPRECATED HCO3 PLAS-SCNC: 28 MMOL/L (ref 22–29)
EOSINOPHIL # BLD AUTO: 0.1 10E3/UL (ref 0–0.7)
EOSINOPHIL NFR BLD AUTO: 2 %
ERYTHROCYTE [DISTWIDTH] IN BLOOD BY AUTOMATED COUNT: 13 % (ref 10–15)
GFR SERPL CREATININE-BSD FRML MDRD: 70 ML/MIN/1.73M2
GLUCOSE SERPL-MCNC: 96 MG/DL (ref 70–99)
HCT VFR BLD AUTO: 45.4 % (ref 35–47)
HDLC SERPL-MCNC: 71 MG/DL
HGB BLD-MCNC: 14.8 G/DL (ref 11.7–15.7)
IMM GRANULOCYTES # BLD: 0 10E3/UL
IMM GRANULOCYTES NFR BLD: 0 %
LDLC SERPL CALC-MCNC: 76 MG/DL
LYMPHOCYTES # BLD AUTO: 1.7 10E3/UL (ref 0.8–5.3)
LYMPHOCYTES NFR BLD AUTO: 32 %
MCH RBC QN AUTO: 29 PG (ref 26.5–33)
MCHC RBC AUTO-ENTMCNC: 32.6 G/DL (ref 31.5–36.5)
MCV RBC AUTO: 89 FL (ref 78–100)
MONOCYTES # BLD AUTO: 0.6 10E3/UL (ref 0–1.3)
MONOCYTES NFR BLD AUTO: 11 %
NEUTROPHILS # BLD AUTO: 2.8 10E3/UL (ref 1.6–8.3)
NEUTROPHILS NFR BLD AUTO: 54 %
NONHDLC SERPL-MCNC: 92 MG/DL
NRBC # BLD AUTO: 0 10E3/UL
NRBC BLD AUTO-RTO: 0 /100
PLATELET # BLD AUTO: 207 10E3/UL (ref 150–450)
POTASSIUM SERPL-SCNC: 4.2 MMOL/L (ref 3.4–5.3)
PROT SERPL-MCNC: 7.2 G/DL (ref 6.4–8.3)
RBC # BLD AUTO: 5.11 10E6/UL (ref 3.8–5.2)
SODIUM SERPL-SCNC: 140 MMOL/L (ref 136–145)
TRIGL SERPL-MCNC: 79 MG/DL
TSH SERPL DL<=0.005 MIU/L-ACNC: 2.88 UIU/ML (ref 0.3–4.2)
WBC # BLD AUTO: 5.3 10E3/UL (ref 4–11)

## 2023-01-30 PROCEDURE — 84443 ASSAY THYROID STIM HORMONE: CPT | Mod: ZL | Performed by: NURSE PRACTITIONER

## 2023-01-30 PROCEDURE — 99213 OFFICE O/P EST LOW 20 MIN: CPT | Mod: 25 | Performed by: NURSE PRACTITIONER

## 2023-01-30 PROCEDURE — 36415 COLL VENOUS BLD VENIPUNCTURE: CPT | Mod: ZL | Performed by: NURSE PRACTITIONER

## 2023-01-30 PROCEDURE — 85025 COMPLETE CBC W/AUTO DIFF WBC: CPT | Mod: ZL | Performed by: NURSE PRACTITIONER

## 2023-01-30 PROCEDURE — 80061 LIPID PANEL: CPT | Mod: ZL | Performed by: NURSE PRACTITIONER

## 2023-01-30 PROCEDURE — 99397 PER PM REEVAL EST PAT 65+ YR: CPT | Performed by: NURSE PRACTITIONER

## 2023-01-30 PROCEDURE — 80053 COMPREHEN METABOLIC PANEL: CPT | Mod: ZL | Performed by: NURSE PRACTITIONER

## 2023-01-30 ASSESSMENT — PAIN SCALES - GENERAL: PAINLEVEL: NO PAIN (0)

## 2023-01-31 ENCOUNTER — TELEPHONE (OUTPATIENT)
Dept: MAMMOGRAPHY | Facility: OTHER | Age: 73
End: 2023-01-31

## 2023-01-31 ENCOUNTER — ANCILLARY PROCEDURE (OUTPATIENT)
Dept: MAMMOGRAPHY | Facility: OTHER | Age: 73
End: 2023-01-31
Attending: NURSE PRACTITIONER
Payer: MEDICARE

## 2023-01-31 DIAGNOSIS — Z12.31 VISIT FOR SCREENING MAMMOGRAM: ICD-10-CM

## 2023-01-31 PROCEDURE — 77067 SCR MAMMO BI INCL CAD: CPT | Mod: TC

## 2023-02-16 ENCOUNTER — HOSPITAL ENCOUNTER (OUTPATIENT)
Dept: PHYSICAL THERAPY | Facility: HOSPITAL | Age: 73
Setting detail: THERAPIES SERIES
Discharge: HOME | End: 2023-02-16
Attending: NURSE PRACTITIONER
Payer: MEDICARE

## 2023-02-16 DIAGNOSIS — M25.562 CHRONIC PAIN OF LEFT KNEE: ICD-10-CM

## 2023-02-16 DIAGNOSIS — G89.29 CHRONIC PAIN OF LEFT KNEE: ICD-10-CM

## 2023-02-16 PROCEDURE — 97110 THERAPEUTIC EXERCISES: CPT | Mod: GP

## 2023-02-16 PROCEDURE — 97161 PT EVAL LOW COMPLEX 20 MIN: CPT | Mod: GP

## 2023-02-16 ASSESSMENT — ACTIVITIES OF DAILY LIVING (ADL)
WALK: ACTIVITY IS MINIMALLY DIFFICULT
KNEE_ACTIVITY_OF_DAILY_LIVING_SUM: 46
KNEE_ACTIVITY_OF_DAILY_LIVING_SCORE: 65.71
STIFFNESS: THE SYMPTOM AFFECTS MY ACTIVITY MODERATELY
RAW_SCORE: 46
SIT WITH YOUR KNEE BENT: ACTIVITY IS NOT DIFFICULT
HOW_WOULD_YOU_RATE_THE_CURRENT_FUNCTION_OF_YOUR_KNEE_DURING_YOUR_USUAL_DAILY_ACTIVITIES_ON_A_SCALE_FROM_0_TO_100_WITH_100_BEING_YOUR_LEVEL_OF_KNEE_FUNCTION_PRIOR_TO_YOUR_INJURY_AND_0_BEING_THE_INABILITY_TO_PERFORM_ANY_OF_YOUR_USUAL_DAILY_ACTIVITIES?: 7
STAND: ACTIVITY IS MINIMALLY DIFFICULT
KNEEL ON THE FRONT OF YOUR KNEE: ACTIVITY IS SOMEWHAT DIFFICULT
AS_A_RESULT_OF_YOUR_KNEE_INJURY,_HOW_WOULD_YOU_RATE_YOUR_CURRENT_LEVEL_OF_DAILY_ACTIVITY?: ABNORMAL
WEAKNESS: THE SYMPTOM AFFECTS MY ACTIVITY SLIGHTLY
PAIN: I HAVE THE SYMPTOM BUT IT DOES NOT AFFECT MY ACTIVITY
RISE FROM A CHAIR: ACTIVITY IS MINIMALLY DIFFICULT
GO DOWN STAIRS: ACTIVITY IS VERY DIFFICULT
GIVING WAY, BUCKLING OR SHIFTING OF KNEE: I DO NOT HAVE THE SYMPTOM
LIMPING: I DO NOT HAVE THE SYMPTOM
SWELLING: I HAVE THE SYMPTOM BUT IT DOES NOT AFFECT MY ACTIVITY
HOW_WOULD_YOU_RATE_THE_OVERALL_FUNCTION_OF_YOUR_KNEE_DURING_YOUR_USUAL_DAILY_ACTIVITIES?: ABNORMAL
GO UP STAIRS: ACTIVITY IS FAIRLY DIFFICULT
SQUAT: I AM UNABLE TO DO THE ACTIVITY

## 2023-02-16 NOTE — PROGRESS NOTES
"   02/16/23 1400   General Information   Type of Visit Initial OP Ortho PT Evaluation   Start of Care Date 02/16/23   Referring Physician Tiny Issa NP   Patient/Family Goals Statement Pt. would like to increase her comfort with stairs and walking her dog.   Orders Evaluate and Treat   Date of Order 01/30/23   Certification Required? Yes   Medical Diagnosis Chronic pain of left knee   Surgical/Medical history reviewed Yes  (Past meniscectomy of left knee.)   Precautions/Limitations no known precautions/limitations       Present No   Body Part(s)   Body Part(s) Knee   Presentation and Etiology   Pertinent history of current problem (include personal factors and/or comorbidities that impact the POC) Pt. presents with a history of chronic left knee pain. She reports her pain began in 2019 when she slipped on black ice. Prior imaging revealed revealed a tear of the lateral meniscus on left. Pt. did have a meniscectomy performed with Dr. Nava in February of 2022. She reports no formal therapy following but she was compliant with the exercises given to her through OA. Pain is present in anterior lateral aspect of left knee and is worse with stairs or walking up a steep incline. She has found some minor relief with CBD cream thus far. No red flag findings are present today.   Impairments A. Pain;E. Decreased flexibility   Functional Limitations perform activities of daily living;perform desired leisure / sports activities   Symptom Location Pt. reports pain in anterior/lateral aspect of left knee.   How/Where did it occur Other  (\"Knee surgery Feb 2022.\")   Onset date of current episode/exacerbation   (As above)   Chronicity Chronic   Pain rating (0-10 point scale) Best (/10);Worst (/10)   Best (/10) 1   Worst (/10) 10   Pain quality C. Aching;F. Stabbing   Frequency of pain/symptoms B. Intermittent   Pain/symptoms are: Other   Pain symptoms comment Worse with stairs or walking on incline " "  Pain/symptoms exacerbated by M. Other  (Stairs)   Pain/symptoms eased by A. Sitting;B. Walking;C. Rest   Progression of symptoms since onset: Other   Pain progression comment Improved with topical medication.   Current / Previous Interventions   Diagnostic Tests: MRI   MRI Results Results   MRI results IMPRESSION:    Patellofemoral joint chondromalacia with chronic appearing  full-thickness cartilage loss at the patellar apex with subchondral  cystic change and edema.     Superior articular surface tear involving the anterior horn and body  of the lateral meniscus.     Findings suggesting a patellar tracking abnormality with enlarged  lateral trochlear and likely chronic lateral subluxation of the  patella. No evidence of acute or subacute retinacular injury. No  evidence of recent patellar dislocation   Prior Level of Function   Prior Level of Function-Mobility Independent   Prior Level of Function-ADLs Independent   Current Level of Function   Patient role/employment history F. Retired   Living environment [place]   Current equipment-Gait/Locomotion None   Current equipment-ADL None   Fall Risk Screen   Fall screen completed by PT   Have you fallen 2 or more times in the past year? No   Have you fallen and had an injury in the past year? No   Timed Up and Go score (seconds) NT   Is patient a fall risk? No   Abuse Screen (yes response referral indicated)   Feels Unsafe at Home or Work/School no   Feels Threatened by Someone no   Does Anyone Try to Keep You From Having Contact with Others or Doing Things Outside Your Home? no   Physical Signs of Abuse Present no   Presenting problem Chronic Left knee pain   Patient needs abuse support services and resources No   Knee Objective Findings   Gait/Locomotion Unremarkable   Balance/Proprioception (Single Leg Stance) Instability bilateral. Worse on left   Step Test Height 6\"   Step Test Height Control Comment Dynamic valgus and instability observed   Palpation " Palpably tender to anterior lateral joint line.   Accessory Motion/Joint Mobility WNL   Observation J sign with OKC knee extension   Posture Unremarkable   Lachmans Test negative   Anterior Drawer Test negative   Posterior Drawer Test negative   Varus Stress Test negative   Valgus Stress Test negative   Mila's Test negative   Side (if bilateral, select both right and left) Right;Left   Knee Special Test Comments Patellar compression and patellar grind positive on left   Right Knee Extension AROM WNL   Right Knee Extension PROM WNL   Left Knee Extension AROM WNL   Left Knee Extension PROM WNL   Right Knee Flexion AROM WNL   Right Knee Flexion PROM WNL   Left Knee Flexion AROM WNL pain/catch mid to end range   Left Knee Flexion PROM WNL pain/catch mid to end range   Right Knee Flexion Strength 5/5   Left Knee Flexion Strength 5/5   Right Knee Extension Strength 5/5   Left Knee Extension Strength 4/5   Right Hip Abduction Strength 4/5   Left Hip Abduction Strength 4/5   Right Quad Set Strength Good   Left Quad Set Strength Good   R VMO Strength Good   L VMO Strength Limited   Right Hip Flexor Flexibility Limited bilateral   Left Hip Flexor Flexibility Limited   Right Quadricep Flexibility Limited bilateral   Left Quadricep Flexibility Limited   Planned Therapy Interventions   Planned Therapy Interventions balance training;joint mobilization;manual therapy;neuromuscular re-education;ROM;strengthening;stretching   Planned Modality Interventions   Planned Modality Interventions Cryotherapy;Electrical stimulation;Hot packs;Ultrasound   Clinical Impression   Criteria for Skilled Therapeutic Interventions Met yes, treatment indicated   PT Diagnosis Patellofemoral pain due to strength and flexibility deficits.   Influenced by the following impairments pain, Flexibility, strength, stability, Functional activity tolerance.   Functional limitations due to impairments Pt. reports pain and discomfort with ADL's and preferred  "recreational activities.   Clinical Presentation Stable/Uncomplicated   Clinical Presentation Rationale Therapist discretion.   Clinical Decision Making (Complexity) Low complexity   Therapy Frequency 2 times/Week   Predicted Duration of Therapy Intervention (days/wks) 8 weeks   Risk & Benefits of therapy have been explained Yes   Patient, Family & other staff in agreement with plan of care Yes   Clinical Impression Comments Pt. presents to therapy with patellofemoral pain due to strength and flexibility deficits of the lower quarter. Skilled physical therapy recommended to restore strength, stability, and tolerance for activity.   Education Assessment   Preferred Learning Style Listening;Reading;Demonstration;Pictures/video   Barriers to Learning No barriers   ORTHO GOALS   PT Ortho Eval Goals 1;2;3;4   Ortho Goal 1   Goal Identifier LTG #1   Goal Description Pt. to be independent with HEP to progress to independent home management of her condition.   Target Date 04/13/23   Ortho Goal 2   Goal Identifier LTG #2   Goal Description Pt. to report minimal symptom provocation with stairs and daily walks to restore her PLOF.   Target Date 04/13/23   Ortho Goal 3   Goal Identifier LTG #3   Goal Description Pt. to demonstrate ability to perform step up on 6\" step with improved patellar tracking.   Target Date 04/13/23   Ortho Goal 4   Goal Identifier STG #1   Goal Description Pt. to report decrease in pain to 5/10 or worst with stairs to improve comfort with daily activities.   Target Date 03/16/23   Total Evaluation Time   PT Eval, Low Complexity Minutes (42844) 33   Therapy Certification   Certification date from 02/16/23   Certification date to 04/13/23   Medical Diagnosis Chronic pain of left knee   I certify the need for these services furnished under this plan of treatment and while under my care. (Physician co-signature of this document indicates review and certification of the therapy plan).   "

## 2023-02-17 ENCOUNTER — HOSPITAL ENCOUNTER (OUTPATIENT)
Dept: CARDIOLOGY | Facility: HOSPITAL | Age: 73
Discharge: HOME | End: 2023-02-17
Attending: NURSE PRACTITIONER
Payer: MEDICARE

## 2023-02-17 ENCOUNTER — HOSPITAL ENCOUNTER (OUTPATIENT)
Dept: BONE DENSITY | Facility: HOSPITAL | Age: 73
Discharge: HOME | End: 2023-02-17
Attending: NURSE PRACTITIONER
Payer: MEDICARE

## 2023-02-17 DIAGNOSIS — Z78.0 ASYMPTOMATIC POSTMENOPAUSAL ESTROGEN DEFICIENCY: ICD-10-CM

## 2023-02-17 DIAGNOSIS — R01.1 HEART MURMUR: ICD-10-CM

## 2023-02-17 LAB — LVEF ECHO: NORMAL

## 2023-02-17 PROCEDURE — 77080 DXA BONE DENSITY AXIAL: CPT

## 2023-02-17 PROCEDURE — 93306 TTE W/DOPPLER COMPLETE: CPT | Mod: 26 | Performed by: INTERNAL MEDICINE

## 2023-02-17 PROCEDURE — 93306 TTE W/DOPPLER COMPLETE: CPT

## 2023-02-21 NOTE — PROGRESS NOTES
--Aide is a 72 year old who is being evaluated via a billable telephone visit.      What phone number would you like to be contacted at? [phone]  How would you like to obtain your AVS? Kiko     Distant Location (provider location):  On-site    Assessment & Plan     Osteopenia, unspecified location  Patient with osteopenia, but hip fracture risk greater than 3 percent at 4.6 percent. Will therefore start Fosamax. She was made aware of the side effects. She has normal kidney function. She does not smoke. Weight bearing exercises encouraged. Will also continue calcium supplement and run a Vit D level on her. Will notify patient of the results when available and intervene accordingly.       Will repeat dexa-scan in 2 years.     Will continue the Fosamax for 5 years and then consider drug holiday.     - Vitamin D Deficiency; Future  - Calcium-Cholecalciferol-Zinc (VIACTIV CALCIUM IMMUNE) 650-20-5.5 MG-MCG-MG CHEW  - alendronate (FOSAMAX) 70 MG tablet; Take 1 tablet (70 mg) by mouth every 7 days    Encounter for vitamin deficiency screening  - Vitamin D Deficiency; Future  -Will notify patient of the results when available and intervene accordingly.     Tiny Issa NP  [facility] - [place]    Subjective   Aide is a 72 year old, presenting for the following health issues:  Results      HPI     Follow-Up Dexa-Scan Results  Dexa-scan done on 2/17/23. Osteopenia seen. Major osteoporotic fracture risk was 18 percent, hip fracture risk was 4.6 percent.     She has normal kidney function.     She has regular dental exams. No issues.     She does take a calcium supplement with Vit D.     No recent fracture.     She does not smoke.     Rarely drinks alcohol.     Mother and sister have osteoporosis.       Review of Systems   Constitutional, HEENT, cardiovascular, pulmonary, gi and gu systems are negative, except as otherwise noted.      Objective    Vitals - Patient Reported  Weight (Patient Reported):  73.5 kg (162 lb)  Pain Score: No Pain (0)      Vitals:  No vitals were obtained today due to virtual visit.    Physical Exam   healthy, alert and no distress  PSYCH: Alert and oriented times 3; coherent speech, normal   rate and volume, able to articulate logical thoughts, able   to abstract reason, no tangential thoughts, no hallucinations   or delusions  Her affect is normal  RESP: No cough, no audible wheezing, able to talk in full sentences  Remainder of exam unable to be completed due to telephone visits    Procedure:  DX HIP/PELVIS/SPINE     History: Female, age 72 years; Asymptomatic postmenopausal estrogen  deficiency     Comparison:  5/23/2019                                                                      IMPRESSION: Osteopenia.     Hip:  0.697 g/cm2. T score: -2.0. Percent change compared to previous:  -7.9 %     Lumbar spine:  0.987 g/cm2. T score: -0.5. Percent change compared to  previous: 3.9 %     FRAX Score at hip is:  4.6  %     FRAX Score for major osteoporotic fracture is:  18  %           Only the lowest category is reported for each patient per guidelines  of the International Society for Clinical Densitometry.     See attached DXA images and FRAX report for further details.     WHO DIAGNOSTIC GUIDELINES FOR BONE MASS MEASUREMENT:     Normal                        T-Score at or above -1.0     Osteopenia                T-Score between -1.0 and -2.5     Osteoporosis            T-Score at or below -2.5     Providers should consider medical therapies when 10 year probability  of hip fracture is greater than or equal to 3%, or 10 year probability  of major osteoporosis related fracture is greater than or equal to  20%.     ** Exam performed on GE Lunar Prodigy Advance        ** Exam performed on GE Lunar Prodig[employer] Advance     JENNIFER OLGUIN MD         Phone call duration: 8 minutes

## 2023-02-23 ENCOUNTER — HOSPITAL ENCOUNTER (OUTPATIENT)
Dept: PHYSICAL THERAPY | Facility: HOSPITAL | Age: 73
Setting detail: THERAPIES SERIES
Discharge: HOME | End: 2023-02-23
Attending: NURSE PRACTITIONER
Payer: MEDICARE

## 2023-02-23 PROCEDURE — 97110 THERAPEUTIC EXERCISES: CPT | Mod: GP,CQ

## 2023-02-24 ENCOUNTER — VIRTUAL VISIT (OUTPATIENT)
Dept: FAMILY MEDICINE | Facility: OTHER | Age: 73
End: 2023-02-24
Attending: NURSE PRACTITIONER
Payer: COMMERCIAL

## 2023-02-24 DIAGNOSIS — M85.80 OSTEOPENIA, UNSPECIFIED LOCATION: Primary | ICD-10-CM

## 2023-02-24 DIAGNOSIS — Z13.21 ENCOUNTER FOR VITAMIN DEFICIENCY SCREENING: ICD-10-CM

## 2023-02-24 PROCEDURE — 99213 OFFICE O/P EST LOW 20 MIN: CPT | Mod: 95 | Performed by: NURSE PRACTITIONER

## 2023-02-24 RX ORDER — ALENDRONATE SODIUM 70 MG/1
70 TABLET ORAL
Qty: 12 TABLET | Refills: 3 | Status: SHIPPED | OUTPATIENT
Start: 2023-02-24 | End: 2023-04-27

## 2023-03-09 ENCOUNTER — HOSPITAL ENCOUNTER (OUTPATIENT)
Dept: PHYSICAL THERAPY | Facility: HOSPITAL | Age: 73
Setting detail: THERAPIES SERIES
Discharge: HOME | End: 2023-03-09
Attending: NURSE PRACTITIONER
Payer: MEDICARE

## 2023-03-09 ENCOUNTER — LAB (OUTPATIENT)
Dept: LAB | Facility: OTHER | Age: 73
End: 2023-03-09
Payer: MEDICARE

## 2023-03-09 DIAGNOSIS — M85.80 OSTEOPENIA, UNSPECIFIED LOCATION: ICD-10-CM

## 2023-03-09 DIAGNOSIS — Z13.21 ENCOUNTER FOR VITAMIN DEFICIENCY SCREENING: ICD-10-CM

## 2023-03-09 PROCEDURE — 82306 VITAMIN D 25 HYDROXY: CPT | Mod: ZL

## 2023-03-09 PROCEDURE — 97110 THERAPEUTIC EXERCISES: CPT | Mod: GP

## 2023-03-09 PROCEDURE — 36415 COLL VENOUS BLD VENIPUNCTURE: CPT | Mod: ZL

## 2023-03-09 PROCEDURE — 97140 MANUAL THERAPY 1/> REGIONS: CPT | Mod: GP

## 2023-03-13 LAB — DEPRECATED CALCIDIOL+CALCIFEROL SERPL-MC: 54 UG/L (ref 20–75)

## 2023-03-24 ENCOUNTER — HOSPITAL ENCOUNTER (OUTPATIENT)
Dept: PHYSICAL THERAPY | Facility: HOSPITAL | Age: 73
Setting detail: THERAPIES SERIES
Discharge: HOME | End: 2023-03-24
Attending: NURSE PRACTITIONER
Payer: MEDICARE

## 2023-03-24 PROCEDURE — 97110 THERAPEUTIC EXERCISES: CPT | Mod: GP

## 2023-03-28 ENCOUNTER — MYC MEDICAL ADVICE (OUTPATIENT)
Dept: FAMILY MEDICINE | Facility: OTHER | Age: 73
End: 2023-03-28

## 2023-03-28 DIAGNOSIS — M85.80 OSTEOPENIA, UNSPECIFIED LOCATION: Primary | ICD-10-CM

## 2023-03-28 RX ORDER — ALBUTEROL SULFATE 90 UG/1
1-2 AEROSOL, METERED RESPIRATORY (INHALATION)
Status: CANCELLED
Start: 2023-03-28

## 2023-03-28 RX ORDER — ZOLEDRONIC ACID 5 MG/100ML
5 INJECTION, SOLUTION INTRAVENOUS ONCE
Status: CANCELLED
Start: 2023-03-28

## 2023-03-28 RX ORDER — DIPHENHYDRAMINE HYDROCHLORIDE 50 MG/ML
50 INJECTION INTRAMUSCULAR; INTRAVENOUS
Status: CANCELLED
Start: 2023-03-28

## 2023-03-28 RX ORDER — EPINEPHRINE 1 MG/ML
0.3 INJECTION, SOLUTION, CONCENTRATE INTRAVENOUS EVERY 5 MIN PRN
Status: CANCELLED | OUTPATIENT
Start: 2023-03-28

## 2023-03-28 RX ORDER — ACETAMINOPHEN 325 MG/1
650 TABLET ORAL ONCE
Status: CANCELLED | OUTPATIENT
Start: 2023-03-28

## 2023-03-28 RX ORDER — MEPERIDINE HYDROCHLORIDE 25 MG/ML
25 INJECTION INTRAMUSCULAR; INTRAVENOUS; SUBCUTANEOUS EVERY 30 MIN PRN
Status: CANCELLED | OUTPATIENT
Start: 2023-03-28

## 2023-03-28 RX ORDER — HEPARIN SODIUM,PORCINE 10 UNIT/ML
5 VIAL (ML) INTRAVENOUS
Status: CANCELLED | OUTPATIENT
Start: 2023-03-28

## 2023-03-28 RX ORDER — ALBUTEROL SULFATE 0.83 MG/ML
2.5 SOLUTION RESPIRATORY (INHALATION)
Status: CANCELLED | OUTPATIENT
Start: 2023-03-28

## 2023-03-28 RX ORDER — METHYLPREDNISOLONE SODIUM SUCCINATE 125 MG/2ML
125 INJECTION, POWDER, LYOPHILIZED, FOR SOLUTION INTRAMUSCULAR; INTRAVENOUS
Status: CANCELLED
Start: 2023-03-28

## 2023-03-28 RX ORDER — HEPARIN SODIUM (PORCINE) LOCK FLUSH IV SOLN 100 UNIT/ML 100 UNIT/ML
5 SOLUTION INTRAVENOUS
Status: CANCELLED | OUTPATIENT
Start: 2023-03-28

## 2023-03-30 ENCOUNTER — HOSPITAL ENCOUNTER (OUTPATIENT)
Dept: PHYSICAL THERAPY | Facility: HOSPITAL | Age: 73
Setting detail: THERAPIES SERIES
Discharge: HOME | End: 2023-03-30
Attending: NURSE PRACTITIONER
Payer: MEDICARE

## 2023-03-30 PROCEDURE — 97110 THERAPEUTIC EXERCISES: CPT | Mod: GP

## 2023-04-13 ENCOUNTER — HOSPITAL ENCOUNTER (OUTPATIENT)
Dept: PHYSICAL THERAPY | Facility: HOSPITAL | Age: 73
Setting detail: THERAPIES SERIES
Discharge: HOME | End: 2023-04-13
Attending: NURSE PRACTITIONER
Payer: MEDICARE

## 2023-04-13 PROCEDURE — 97110 THERAPEUTIC EXERCISES: CPT | Mod: GP

## 2023-04-13 NOTE — PROGRESS NOTES
"[facility]    Outpatient Physical Therapy Progress Note  Patient: Aide Galarza  : 1950    Beginning/End Dates of Reporting Period:  23 to 23    Referring Provider: Tiny Issa NP    Therapy Diagnosis: Patellofemoral pain due to strength and flexibility deficits.     Client Self Report: (P) Aide is doing well today she has been compliant with her HEP. She does notes some continued lateral knee discomfort especially with descending stairs.    Goals:  Goal Identifier LTG #1   Goal Description Pt. to be independent with HEP to progress to independent home management of her condition.   Target Date 23   Date Met      Progress (detail required for progress note): (P) Partially met: Pt. is complaint but is not yet ready for independent home management of her condition.     Goal Identifier LTG #2   Goal Description Pt. to report minimal symptom provocation with stairs and daily walks to restore her PLOF.   Target Date 23   Date Met      Progress (detail required for progress note): (P) Partially met: Pt. is improving but still reports lateral knee pain with stairs.     Goal Identifier LTG #3   Goal Description Pt. to demonstrate ability to perform step up on 6\" step with improved patellar tracking.   Target Date 23   Date Met      Progress (detail required for progress note): (P) MET: Lateral tracking moderately improved     Goal Identifier STG #1   Goal Description Pt. to report decrease in pain to 5/10 or worst with stairs to improve comfort with daily activities.   Target Date 23   Date Met  (P) 23   Progress (detail required for progress note): (P) MET       Plan:  Continue therapy per current plan of care.    Discharge:  No  RECERTIFICATION    Aide Galarza  1950    Session Number: 6 medicare for HB supplement since start of care.    Reasons for Continuing " Treatment:   Pt. Continues to report pain in lateral aspect of left knee. Patellar mal-tracking remains today but is improving. Anticipate that problem would continue to benefit from conservative therapy moving forward.     Frequency/Duration  1 times per 2 weeks for 8 weeks for a total of 4 visits.    Recertification Period  4/13/23 - 6/8/23    Physician Signature:                                                Date:    X_______________________________________________________    Physician Name: Tiny Issa NP    I certify the need for these services furnished under this plan of treatment and while under my care. Physician co-signature of this document indicates review and certification of the therapy plan.  This signature may be written on paper, or electronically signed within EPIC.

## 2023-04-17 ENCOUNTER — TELEPHONE (OUTPATIENT)
Dept: FAMILY MEDICINE | Facility: OTHER | Age: 73
End: 2023-04-17

## 2023-04-18 ENCOUNTER — TELEPHONE (OUTPATIENT)
Dept: INFUSION THERAPY | Facility: OTHER | Age: 73
End: 2023-04-18

## 2023-04-27 ENCOUNTER — INFUSION THERAPY VISIT (OUTPATIENT)
Dept: INFUSION THERAPY | Facility: OTHER | Age: 73
End: 2023-04-27
Attending: NURSE PRACTITIONER
Payer: MEDICARE

## 2023-04-27 ENCOUNTER — HOSPITAL ENCOUNTER (OUTPATIENT)
Dept: PHYSICAL THERAPY | Facility: HOSPITAL | Age: 73
Setting detail: THERAPIES SERIES
Discharge: HOME | End: 2023-04-27
Attending: NURSE PRACTITIONER
Payer: MEDICARE

## 2023-04-27 VITALS
WEIGHT: 169.53 LBS | HEIGHT: 65 IN | OXYGEN SATURATION: 95 % | SYSTOLIC BLOOD PRESSURE: 123 MMHG | HEART RATE: 83 BPM | BODY MASS INDEX: 28.25 KG/M2 | TEMPERATURE: 98.6 F | DIASTOLIC BLOOD PRESSURE: 61 MMHG

## 2023-04-27 DIAGNOSIS — M85.80 OSTEOPENIA, UNSPECIFIED LOCATION: Primary | ICD-10-CM

## 2023-04-27 LAB
CALCIUM SERPL-MCNC: 9.6 MG/DL (ref 8.8–10.2)
CREAT SERPL-MCNC: 0.81 MG/DL (ref 0.51–0.95)
GFR SERPL CREATININE-BSD FRML MDRD: 78 ML/MIN/1.73M2

## 2023-04-27 PROCEDURE — 97110 THERAPEUTIC EXERCISES: CPT | Mod: GP

## 2023-04-27 PROCEDURE — 82565 ASSAY OF CREATININE: CPT | Mod: ZL | Performed by: NURSE PRACTITIONER

## 2023-04-27 PROCEDURE — 82310 ASSAY OF CALCIUM: CPT | Mod: ZL | Performed by: NURSE PRACTITIONER

## 2023-04-27 PROCEDURE — 96365 THER/PROPH/DIAG IV INF INIT: CPT

## 2023-04-27 PROCEDURE — 258N000003 HC RX IP 258 OP 636: Performed by: NURSE PRACTITIONER

## 2023-04-27 PROCEDURE — 250N000011 HC RX IP 250 OP 636: Performed by: NURSE PRACTITIONER

## 2023-04-27 PROCEDURE — 36415 COLL VENOUS BLD VENIPUNCTURE: CPT | Mod: ZL | Performed by: NURSE PRACTITIONER

## 2023-04-27 RX ORDER — ACETAMINOPHEN 325 MG/1
650 TABLET ORAL ONCE
Status: CANCELLED | OUTPATIENT
Start: 2023-04-27

## 2023-04-27 RX ORDER — ZOLEDRONIC ACID 5 MG/100ML
5 INJECTION, SOLUTION INTRAVENOUS ONCE
Status: CANCELLED
Start: 2023-04-27

## 2023-04-27 RX ORDER — EPINEPHRINE 1 MG/ML
0.3 INJECTION, SOLUTION, CONCENTRATE INTRAVENOUS EVERY 5 MIN PRN
Status: CANCELLED | OUTPATIENT
Start: 2023-04-27

## 2023-04-27 RX ORDER — ZOLEDRONIC ACID 5 MG/100ML
5 INJECTION, SOLUTION INTRAVENOUS ONCE
Status: COMPLETED | OUTPATIENT
Start: 2023-04-27 | End: 2023-04-27

## 2023-04-27 RX ORDER — HEPARIN SODIUM,PORCINE 10 UNIT/ML
5 VIAL (ML) INTRAVENOUS
Status: CANCELLED | OUTPATIENT
Start: 2023-04-27

## 2023-04-27 RX ORDER — DIPHENHYDRAMINE HYDROCHLORIDE 50 MG/ML
50 INJECTION INTRAMUSCULAR; INTRAVENOUS
Status: CANCELLED
Start: 2023-04-27

## 2023-04-27 RX ORDER — MEPERIDINE HYDROCHLORIDE 25 MG/ML
25 INJECTION INTRAMUSCULAR; INTRAVENOUS; SUBCUTANEOUS EVERY 30 MIN PRN
Status: CANCELLED | OUTPATIENT
Start: 2023-04-27

## 2023-04-27 RX ORDER — METHYLPREDNISOLONE SODIUM SUCCINATE 125 MG/2ML
125 INJECTION, POWDER, LYOPHILIZED, FOR SOLUTION INTRAMUSCULAR; INTRAVENOUS
Status: CANCELLED
Start: 2023-04-27

## 2023-04-27 RX ORDER — ALBUTEROL SULFATE 0.83 MG/ML
2.5 SOLUTION RESPIRATORY (INHALATION)
Status: CANCELLED | OUTPATIENT
Start: 2023-04-27

## 2023-04-27 RX ORDER — HEPARIN SODIUM (PORCINE) LOCK FLUSH IV SOLN 100 UNIT/ML 100 UNIT/ML
5 SOLUTION INTRAVENOUS
Status: CANCELLED | OUTPATIENT
Start: 2023-04-27

## 2023-04-27 RX ORDER — ALBUTEROL SULFATE 90 UG/1
1-2 AEROSOL, METERED RESPIRATORY (INHALATION)
Status: CANCELLED
Start: 2023-04-27

## 2023-04-27 RX ADMIN — SODIUM CHLORIDE 250 ML: 9 INJECTION, SOLUTION INTRAVENOUS at 12:03

## 2023-04-27 RX ADMIN — ZOLEDRONIC ACID 5 MG: 5 INJECTION, SOLUTION INTRAVENOUS at 12:04

## 2023-04-27 RX ADMIN — SODIUM CHLORIDE 250 ML: 9 INJECTION, SOLUTION INTRAVENOUS at 12:01

## 2023-04-27 ASSESSMENT — PAIN SCALES - GENERAL: PAINLEVEL: NO PAIN (0)

## 2023-04-27 NOTE — PROGRESS NOTES
Patient meets order parameters for today's treatment.     IV pump verified with dose, drug, and rate of administration.  Infusion administered per protocol.  Patient tolerated infusion well, no signs or symptoms of adverse reaction noted.  Patient denies pain nor discomfort.

## 2023-04-27 NOTE — PROGRESS NOTES
Infusion Nursing Note:  Aide Galarza presents today for Reclast.    Patient seen by provider today: No   present during visit today: Not Applicable.    Note: Patient declines Tylenol premedication, notes it never is effective for her so she would prefer not to take. Also notes she only takes calcium daily but gets a lot of calcium in her daily diet, so she will continue to take daily at this time, despite orders/advisement to take 2x/d with reclast dosing. Education provided on medication and effects. Verbalizes understanding.       Intravenous Access:  Labs drawn without difficulty.  Peripheral IV placed (labs taken from IV).    Treatment Conditions:  Lab Results   Component Value Date     01/30/2023    POTASSIUM 4.2 01/30/2023    CR 0.81 04/27/2023    ADRI 9.6 04/27/2023    BILITOTAL 0.5 01/30/2023    ALBUMIN 4.2 01/30/2023    ALT 17 01/30/2023    AST 19 01/30/2023     Creat Clearance 66.3

## 2023-07-06 DIAGNOSIS — E03.9 HYPOTHYROIDISM, UNSPECIFIED TYPE: ICD-10-CM

## 2023-07-06 DIAGNOSIS — K21.9 GASTROESOPHAGEAL REFLUX DISEASE WITHOUT ESOPHAGITIS: ICD-10-CM

## 2023-07-06 DIAGNOSIS — F51.01 PRIMARY INSOMNIA: ICD-10-CM

## 2023-07-06 DIAGNOSIS — I10 ESSENTIAL HYPERTENSION: ICD-10-CM

## 2023-07-07 RX ORDER — ATENOLOL 25 MG/1
TABLET ORAL
Qty: 90 TABLET | Refills: 1 | Status: SHIPPED | OUTPATIENT
Start: 2023-07-07 | End: 2024-01-03

## 2023-07-07 RX ORDER — LEVOTHYROXINE SODIUM 112 UG/1
TABLET ORAL
Qty: 90 TABLET | Refills: 1 | Status: SHIPPED | OUTPATIENT
Start: 2023-07-07 | End: 2024-01-03

## 2023-07-07 RX ORDER — PANTOPRAZOLE SODIUM 40 MG/1
TABLET, DELAYED RELEASE ORAL
Qty: 90 TABLET | Refills: 1 | Status: SHIPPED | OUTPATIENT
Start: 2023-07-07 | End: 2024-01-03

## 2023-07-07 RX ORDER — TRAZODONE HYDROCHLORIDE 50 MG/1
TABLET, FILM COATED ORAL
Qty: 90 TABLET | Refills: 1 | Status: SHIPPED | OUTPATIENT
Start: 2023-07-07 | End: 2024-01-03

## 2023-07-07 NOTE — TELEPHONE ENCOUNTER
levothyroxine (SYNTHROID/LEVOTHROID) 112 MCG tablet 90 tablet 2 10/7/2022     traZODone (DESYREL) 50 MG tablet 90 tablet 2 10/7/2022     atenolol (TENORMIN) 25 MG tablet 90 tablet 2 10/7/2022     pantoprazole (PROTONIX) 40 MG EC tablet 90 tablet 2 10/7/2022     amitriptyline (ELAVIL) 25 MG tablet 90 tablet 2 10/7/2022   Last Office Visit: 02/24/23  Future Office visit:       Routing refill request to provider for review/approval because:

## 2024-01-02 DIAGNOSIS — E03.9 HYPOTHYROIDISM, UNSPECIFIED TYPE: ICD-10-CM

## 2024-01-02 DIAGNOSIS — F51.01 PRIMARY INSOMNIA: ICD-10-CM

## 2024-01-02 DIAGNOSIS — E78.5 HYPERLIPIDEMIA, UNSPECIFIED HYPERLIPIDEMIA TYPE: ICD-10-CM

## 2024-01-02 DIAGNOSIS — I10 ESSENTIAL HYPERTENSION: ICD-10-CM

## 2024-01-02 DIAGNOSIS — K21.9 GASTROESOPHAGEAL REFLUX DISEASE WITHOUT ESOPHAGITIS: ICD-10-CM

## 2024-01-02 NOTE — TELEPHONE ENCOUNTER
Lipitor  Last Written Prescription Date: 1/6/23  Last Fill Quantity: 90 # of Refills: 3  Last Office Visit: 2/24/23    Protonix  Last Written Prescription Date: 7/7/23  Last Fill Quantity: 90 # of Refills: 1  Last Office Visit: 2/24/23    Elavil  Last Written Prescription Date: 7/7/23  Last Fill Quantity: 90 # of Refills: 1  Last Office Visit: 2/24/23    Trazodone  Last Written Prescription Date: 7/7/23  Last Fill Quantity: 90 # of Refills: 1  Last Office Visit: 2/24/23    Atenolol  Last Written Prescription Date: 7/7/23  Last Fill Quantity: 90 # of Refills: 1  Last Office Visit: 2/24/23    Levothyroxine  Last Written Prescription Date: 7/7/23  Last Fill Quantity: 90 # of Refills: 1  Last Office Visit: 2/24/23

## 2024-01-03 RX ORDER — ATENOLOL 25 MG/1
TABLET ORAL
Qty: 90 TABLET | Refills: 0 | Status: SHIPPED | OUTPATIENT
Start: 2024-01-03 | End: 2024-02-21

## 2024-01-03 RX ORDER — ATORVASTATIN CALCIUM 20 MG/1
20 TABLET, FILM COATED ORAL DAILY
Qty: 90 TABLET | Refills: 0 | Status: SHIPPED | OUTPATIENT
Start: 2024-01-03 | End: 2024-02-21

## 2024-01-03 RX ORDER — LEVOTHYROXINE SODIUM 112 UG/1
TABLET ORAL
Qty: 90 TABLET | Refills: 0 | Status: SHIPPED | OUTPATIENT
Start: 2024-01-03 | End: 2024-02-21

## 2024-01-03 RX ORDER — PANTOPRAZOLE SODIUM 40 MG/1
TABLET, DELAYED RELEASE ORAL
Qty: 90 TABLET | Refills: 0 | Status: SHIPPED | OUTPATIENT
Start: 2024-01-03 | End: 2024-02-21

## 2024-01-03 RX ORDER — TRAZODONE HYDROCHLORIDE 50 MG/1
TABLET, FILM COATED ORAL
Qty: 90 TABLET | Refills: 0 | Status: SHIPPED | OUTPATIENT
Start: 2024-01-03 | End: 2024-02-21

## 2024-01-08 ENCOUNTER — TRANSFERRED RECORDS (OUTPATIENT)
Dept: HEALTH INFORMATION MANAGEMENT | Facility: HOSPITAL | Age: 74
End: 2024-01-08

## 2024-02-16 SDOH — HEALTH STABILITY: PHYSICAL HEALTH: ON AVERAGE, HOW MANY MINUTES DO YOU ENGAGE IN EXERCISE AT THIS LEVEL?: 30 MIN

## 2024-02-16 SDOH — HEALTH STABILITY: PHYSICAL HEALTH: ON AVERAGE, HOW MANY DAYS PER WEEK DO YOU ENGAGE IN MODERATE TO STRENUOUS EXERCISE (LIKE A BRISK WALK)?: 2 DAYS

## 2024-02-16 ASSESSMENT — SOCIAL DETERMINANTS OF HEALTH (SDOH): HOW OFTEN DO YOU GET TOGETHER WITH FRIENDS OR RELATIVES?: ONCE A WEEK

## 2024-02-21 ENCOUNTER — OFFICE VISIT (OUTPATIENT)
Dept: FAMILY MEDICINE | Facility: OTHER | Age: 74
End: 2024-02-21
Attending: NURSE PRACTITIONER
Payer: COMMERCIAL

## 2024-02-21 VITALS
TEMPERATURE: 97.6 F | DIASTOLIC BLOOD PRESSURE: 60 MMHG | OXYGEN SATURATION: 98 % | HEIGHT: 65 IN | SYSTOLIC BLOOD PRESSURE: 120 MMHG | WEIGHT: 174 LBS | HEART RATE: 69 BPM | BODY MASS INDEX: 28.99 KG/M2

## 2024-02-21 DIAGNOSIS — K21.9 GASTROESOPHAGEAL REFLUX DISEASE, UNSPECIFIED WHETHER ESOPHAGITIS PRESENT: ICD-10-CM

## 2024-02-21 DIAGNOSIS — I10 ESSENTIAL HYPERTENSION: ICD-10-CM

## 2024-02-21 DIAGNOSIS — F51.01 PRIMARY INSOMNIA: ICD-10-CM

## 2024-02-21 DIAGNOSIS — E03.9 HYPOTHYROIDISM, UNSPECIFIED TYPE: ICD-10-CM

## 2024-02-21 DIAGNOSIS — E78.5 HYPERLIPIDEMIA, UNSPECIFIED HYPERLIPIDEMIA TYPE: ICD-10-CM

## 2024-02-21 DIAGNOSIS — F41.9 ANXIETY: ICD-10-CM

## 2024-02-21 DIAGNOSIS — Z00.00 HEALTH MAINTENANCE EXAMINATION: Primary | ICD-10-CM

## 2024-02-21 DIAGNOSIS — F43.21 GRIEF REACTION: ICD-10-CM

## 2024-02-21 LAB
ALBUMIN SERPL BCG-MCNC: 4.2 G/DL (ref 3.5–5.2)
ALP SERPL-CCNC: 74 U/L (ref 40–150)
ALT SERPL W P-5'-P-CCNC: 21 U/L (ref 0–50)
ANION GAP SERPL CALCULATED.3IONS-SCNC: 8 MMOL/L (ref 7–15)
AST SERPL W P-5'-P-CCNC: 21 U/L (ref 0–45)
BASOPHILS # BLD AUTO: 0.1 10E3/UL (ref 0–0.2)
BASOPHILS NFR BLD AUTO: 1 %
BILIRUB SERPL-MCNC: 0.5 MG/DL
BUN SERPL-MCNC: 12.7 MG/DL (ref 8–23)
CALCIUM SERPL-MCNC: 9.7 MG/DL (ref 8.8–10.2)
CHLORIDE SERPL-SCNC: 103 MMOL/L (ref 98–107)
CHOLEST SERPL-MCNC: 157 MG/DL
CREAT SERPL-MCNC: 0.83 MG/DL (ref 0.51–0.95)
DEPRECATED HCO3 PLAS-SCNC: 29 MMOL/L (ref 22–29)
EGFRCR SERPLBLD CKD-EPI 2021: 75 ML/MIN/1.73M2
EOSINOPHIL # BLD AUTO: 0.2 10E3/UL (ref 0–0.7)
EOSINOPHIL NFR BLD AUTO: 3 %
ERYTHROCYTE [DISTWIDTH] IN BLOOD BY AUTOMATED COUNT: 12.7 % (ref 10–15)
FASTING STATUS PATIENT QL REPORTED: YES
GLUCOSE SERPL-MCNC: 100 MG/DL (ref 70–99)
HCT VFR BLD AUTO: 43.9 % (ref 35–47)
HDLC SERPL-MCNC: 68 MG/DL
HGB BLD-MCNC: 14.6 G/DL (ref 11.7–15.7)
IMM GRANULOCYTES # BLD: 0 10E3/UL
IMM GRANULOCYTES NFR BLD: 0 %
LDLC SERPL CALC-MCNC: 77 MG/DL
LYMPHOCYTES # BLD AUTO: 1.8 10E3/UL (ref 0.8–5.3)
LYMPHOCYTES NFR BLD AUTO: 32 %
MCH RBC QN AUTO: 29 PG (ref 26.5–33)
MCHC RBC AUTO-ENTMCNC: 33.3 G/DL (ref 31.5–36.5)
MCV RBC AUTO: 87 FL (ref 78–100)
MONOCYTES # BLD AUTO: 0.6 10E3/UL (ref 0–1.3)
MONOCYTES NFR BLD AUTO: 11 %
NEUTROPHILS # BLD AUTO: 2.9 10E3/UL (ref 1.6–8.3)
NEUTROPHILS NFR BLD AUTO: 53 %
NONHDLC SERPL-MCNC: 89 MG/DL
NRBC # BLD AUTO: 0 10E3/UL
NRBC BLD AUTO-RTO: 0 /100
PLATELET # BLD AUTO: 246 10E3/UL (ref 150–450)
POTASSIUM SERPL-SCNC: 4.3 MMOL/L (ref 3.4–5.3)
PROT SERPL-MCNC: 7.8 G/DL (ref 6.4–8.3)
RBC # BLD AUTO: 5.04 10E6/UL (ref 3.8–5.2)
SODIUM SERPL-SCNC: 140 MMOL/L (ref 135–145)
TRIGL SERPL-MCNC: 59 MG/DL
TSH SERPL DL<=0.005 MIU/L-ACNC: 3.64 UIU/ML (ref 0.3–4.2)
WBC # BLD AUTO: 5.5 10E3/UL (ref 4–11)

## 2024-02-21 PROCEDURE — 80061 LIPID PANEL: CPT | Mod: ZL | Performed by: NURSE PRACTITIONER

## 2024-02-21 PROCEDURE — 84443 ASSAY THYROID STIM HORMONE: CPT | Mod: ZL | Performed by: NURSE PRACTITIONER

## 2024-02-21 PROCEDURE — 80053 COMPREHEN METABOLIC PANEL: CPT | Mod: ZL | Performed by: NURSE PRACTITIONER

## 2024-02-21 PROCEDURE — 85025 COMPLETE CBC W/AUTO DIFF WBC: CPT | Mod: ZL | Performed by: NURSE PRACTITIONER

## 2024-02-21 PROCEDURE — G0463 HOSPITAL OUTPT CLINIC VISIT: HCPCS

## 2024-02-21 PROCEDURE — G0439 PPPS, SUBSEQ VISIT: HCPCS | Performed by: NURSE PRACTITIONER

## 2024-02-21 PROCEDURE — 36415 COLL VENOUS BLD VENIPUNCTURE: CPT | Mod: ZL | Performed by: NURSE PRACTITIONER

## 2024-02-21 RX ORDER — PANTOPRAZOLE SODIUM 40 MG/1
TABLET, DELAYED RELEASE ORAL
Qty: 90 TABLET | Refills: 0 | Status: SHIPPED | OUTPATIENT
Start: 2024-02-21 | End: 2024-07-01

## 2024-02-21 RX ORDER — LEVOTHYROXINE SODIUM 112 UG/1
112 TABLET ORAL DAILY
Qty: 90 TABLET | Refills: 3 | Status: SHIPPED | OUTPATIENT
Start: 2024-02-21

## 2024-02-21 RX ORDER — TRAZODONE HYDROCHLORIDE 50 MG/1
50 TABLET, FILM COATED ORAL AT BEDTIME
Qty: 90 TABLET | Refills: 3 | Status: SHIPPED | OUTPATIENT
Start: 2024-02-21

## 2024-02-21 RX ORDER — ATORVASTATIN CALCIUM 20 MG/1
20 TABLET, FILM COATED ORAL DAILY
Qty: 90 TABLET | Refills: 3 | Status: SHIPPED | OUTPATIENT
Start: 2024-02-21

## 2024-02-21 RX ORDER — ATENOLOL 25 MG/1
25 TABLET ORAL DAILY
Qty: 90 TABLET | Refills: 3 | Status: SHIPPED | OUTPATIENT
Start: 2024-02-21

## 2024-02-21 ASSESSMENT — PAIN SCALES - GENERAL: PAINLEVEL: NO PAIN (0)

## 2024-02-21 NOTE — PROGRESS NOTES
"Preventive Care Visit  [facility]  Tiny Issa NP, Family Medicine  Feb 21, 2024    Assessment & Plan     (Z00.00) Health maintenance examination  (primary encounter diagnosis)  Plan: Will update labs. Colonoscopy due in 2026. Mammogram scheduled. No longer needs a pap. Dexa-scan due in 1/2025.    (I10) Essential hypertension  Plan: Well controlled. Continue current medications. Encouraged daily exercise and a low sodium diet. Recommended checking BP's 2x/wk, call the clinic if consistantly s>140 or d>90. Follow up in 12 months.     (K21.9) Gastroesophageal reflux disease, unspecified whether esophagitis present  Plan: Stable. Continue current medications.     (E78.5) Hyperlipidemia, unspecified hyperlipidemia type  Plan: Tolerating statin. CMP and lipids in process. Will notify patient of the results when available and intervene accordingly.     (E03.9) Hypothyroidism, unspecified type  Plan: TSH with free T4 reflex, levothyroxine         (SYNTHROID/LEVOTHROID) 112 MCG tablet        TSH in process. Will notify patient of the results when available and intervene accordingly.       (F51.01) Primary insomnia  Plan: Controlled. Will notify patient of the results when available and intervene accordingly.     (F43.21) Grief reaction  (F41.9) Anxiety  Plan: Has good support. Will monitor. No thoughts of self harm.       BMI  Estimated body mass index is 28.96 kg/m  as calculated from the following:    Height as of this encounter: 1.651 m (5' 5\").    Weight as of this encounter: 78.9 kg (174 lb).       Counseling  Appropriate preventive services were discussed with this patient, including applicable screening as appropriate for fall prevention, nutrition, physical activity, Tobacco-use cessation, weight loss and cognition.  Checklist reviewing preventive services available has been given to the patient.  Reviewed patient's diet, addressing concerns and/or questions.   She is at risk for lack of exercise and " has been provided with information to increase physical activity for the benefit of her well-being.   The patient was provided with written information regarding signs of hearing loss.           Subjective   Aide is a 73 year old, presenting for the following:  Physical      Health Care Directive  Patient does not have a Health Care Directive or Living Will: Discussed advance care planning with patient; however, patient declined at this time.    HPI  Hyperlipidemia Follow-Up    Are you regularly taking any medication or supplement to lower your cholesterol?   Yes- atorvastatin   Are you having muscle aches or other side effects that you think could be caused by your cholesterol lowering medication?  No    Hypothyroidism Follow-up    Since last visit, patient describes the following symptoms: Weight stable, no hair loss, no skin changes, no constipation, no loose stools    Functional dyspepsia. Switched to protonix with minimal relief and then amitriptyline was added. Has complete improvement. Would like to continue both. Consumes a healthy diet and avoids trigger foods. No melena. No nausea or vomiting. No abdominal pain.     She tried to get off trazodone, but had significant insomnia, so she went back on it and has been doing well. Would like to continue.      HTN: Not checking BP at home. Denies CP, palpitations, SOB, edema, vision changes, headaches. Currently taking atenolol, does not want to change to metoprolol as her pharmacy continues to get the atenolol. Watches sodium intake. Exercises daily-walking 1 mile daily.     She does have some anxiety. Started after son . Slowly improving. Has good support.         2024   General Health   How would you rate your overall physical health? (!) FAIR   Feel stress (tense, anxious, or unable to sleep) To some extent   (!) STRESS CONCERN-son , increased anxiety      2024   Nutrition   Diet: Low salt    Low fat/cholesterol         2024   Exercise    Days per week of moderate/strenous exercise 2 days   Average minutes spent exercising at this level 30 min   (!) EXERCISE CONCERN      2/16/2024   Social Factors   Frequency of gathering with friends or relatives Once a week   Worry food won't last until get money to buy more No   Food not last or not have enough money for food? No   Do you have housing?  Yes   Are you worried about losing your housing? No   Lack of transportation? No   Unable to get utilities (heat,electricity)? No         2/16/2024   Fall Risk   Fallen 2 or more times in the past year? No   Trouble with walking or balance? No          2/16/2024   Activities of Daily Living- Home Safety   Needs help with the following daily activites None of the above   Safety concerns in the home None of the above         2/16/2024   Dental   Dentist two times every year? Yes         2/16/2024   Hearing Screening   Hearing concerns? (!) TROUBLE UNDERSTANDING SOFT OR WHISPERED SPEECH.     Denies issues with her hearing.         2/16/2024   Driving Risk Screening   Patient/family members have concerns about driving No         2/16/2024   General Alertness/Fatigue Screening   Have you been more tired than usual lately? No         2/16/2024   Urinary Incontinence Screening   Bothered by leaking urine in past 6 months No         2/16/2024   TB Screening   Were you born outside of [place]?  No         Today's PHQ-2 Score:       2/20/2024     1:02 PM   PHQ-2 ( 1999 [employer])   Q1: Little interest or pleasure in doing things 1   Q2: Feeling down, depressed or hopeless 1   PHQ-2 Score 2   Q1: Little interest or pleasure in doing things Several days   Q2: Feeling down, depressed or hopeless Several days   PHQ-2 Score 2           2/16/2024   Substance Use   Alcohol more than 3/day or more than 7/wk No   Do you have a current opioid prescription? No   How severe/bad is pain from 1 to 10? 10/10   Do you use any other substances recreationally? (!) ALCOHOL     Social History      Tobacco Use    Smoking status: Never    Smokeless tobacco: Never   Substance Use Topics    Alcohol use: Yes     Comment: daily- wine     Drug use: No       Mammogram Screening - Mammogram every 1-2 years updated in Health Maintenance based on mutual decision making    The 10-year ASCVD risk score (Abdirahman HAMPTON, et al., 2019) is: 11.3%    Values used to calculate the score:      Age: 73 years      Sex: Female      Is Non- : No      Diabetic: No      Tobacco smoker: No      Systolic Blood Pressure: 120 mmHg      Is BP treated: Yes      HDL Cholesterol: 71 mg/dL      Total Cholesterol: 163 mg/dL        Past Medical History:   Diagnosis Date    Dyspepsia     Hypertension     Hypothyroidism      Past Surgical History:   Procedure Laterality Date    ARTHROSCOPY KNEE Left 02/24/2022    Procedure: Left Knee Arthroscopy, partial Lateral Menisectomy and chondroplasty;  Surgeon: Gokul Nava MD;  Location: [facility] OR    BIOPSY  2021    Skin biopsy    caraitd Left 2011    partial facial disection with malignant tumor- [facility]     COLONOSCOPY N/A 09/30/2016    Procedure: COLONOSCOPY;  Surgeon: Gaurav Santana DO;  Location: [facility] OR    COLONOSCOPY      normal- every 10 years- [facility]     ENT SURGERY  9/5/11    Parotid gland removed;facial dissection     BP Readings from Last 3 Encounters:   02/21/24 120/60   04/27/23 123/61   01/30/23 124/80    Wt Readings from Last 3 Encounters:   02/21/24 78.9 kg (174 lb)   04/27/23 76.9 kg (169 lb 8.5 oz)   01/30/23 73.5 kg (162 lb)                  Patient Active Problem List   Diagnosis    Encounter for general adult medical examination without abnormal findings    Essential hypertension    Gastroesophageal reflux disease    Hypothyroidism    Insomnia    Osteopenia    Postmenopausal status    H/O colonoscopy    Senoia cardiac risk 6% in next 10 years    Tendonitis of knee, left    Hyperlipidemia, unspecified hyperlipidemia type     Past  Surgical History:   Procedure Laterality Date    ARTHROSCOPY KNEE Left 02/24/2022    Procedure: Left Knee Arthroscopy, partial Lateral Menisectomy and chondroplasty;  Surgeon: Gokul Nava MD;  Location: [facility]    BIOPSY  2021    Skin biopsy    caraitd Left 2011    [facility]     COLONOSCOPY N/A 09/30/2016    Procedure: COLONOSCOPY;  Surgeon: Gaurav Santana DO;  Location: [facility]    COLONOSCOPY      normal- every 10 years- [facility]     ENT SURGERY  9/5/11    Parotid gland removed;facial dissection       Social History     Tobacco Use    Smoking status: Never    Smokeless tobacco: Never   Substance Use Topics    Alcohol use: Yes     Comment: daily- wine      Family History   Problem Relation Age of Onset    Hypertension Mother         Vascular dementia    Osteoporosis Mother     Obesity Mother     Parkinsonism Father     Hypertension Father     Thyroid Disease Father         Parkinson s    Hypothyroidism Sister     Hypothyroidism Sister     Hypothyroidism Sister     Hypothyroidism Sister     Osteoporosis Sister     Thyroid Disease Sister     Thyroid Disease Sister     Thyroid Disease Sister     Thyroid Disease Sister     Arrhythmia Son          Current Outpatient Medications   Medication Sig Dispense Refill    amitriptyline (ELAVIL) 25 MG tablet Take 1 tablet (25 mg) by mouth at bedtime 90 tablet 3    atenolol (TENORMIN) 25 MG tablet Take 1 tablet (25 mg) by mouth daily 90 tablet 3    atorvastatin (LIPITOR) 20 MG tablet Take 1 tablet (20 mg) by mouth daily 90 tablet 3    calcium-vitamin D-vitamin K (VIACTIV) 500-500-40 MG-UNT-MCG CHEW Take 1 tablet by mouth daily      levothyroxine (SYNTHROID/LEVOTHROID) 112 MCG tablet Take 1 tablet (112 mcg) by mouth daily 90 tablet 3    MULTIPLE VITAMINS PO Take 1 tablet by mouth daily      pantoprazole (PROTONIX) 40 MG EC tablet TAKE 1 TABLET BY MOUTH EVERY MORNING BEFORE BREAKFAST. DO NOT CRUSH. 90 tablet 0     "traZODone (DESYREL) 50 MG tablet Take 1 tablet (50 mg) by mouth at bedtime 90 tablet 3     Current providers sharing in care for this patient include:  Patient Care Team:  Tiny Issa NP as PCP - General  Tiny Issa NP as Assigned PCP    The following health maintenance items are reviewed in Epic and correct as of today:  Health Maintenance   Topic Date Due    HEPATITIS C SCREENING  Never done    IPV IMMUNIZATION (2 of 3 - Adult catch-up series) 03/29/1976    RSV VACCINE (Pregnancy & 60+) (1 - 1-dose 60+ series) Never done    BMP  01/30/2024    MEDICARE ANNUAL WELLNESS VISIT  01/30/2024    TSH W/FREE T4 REFLEX  01/30/2024    ADVANCE CARE PLANNING  05/06/2024    MAMMO SCREENING  01/31/2025    FALL RISK ASSESSMENT  02/21/2025    GLUCOSE  01/30/2026    COLORECTAL CANCER SCREENING  09/30/2026    LIPID  01/30/2028    DTAP/TDAP/TD IMMUNIZATION (6 - Td or Tdap) 02/13/2028    DEXA  02/17/2038    PHQ-2 (once per calendar year)  Completed    INFLUENZA VACCINE  Completed    Pneumococcal Vaccine: 65+ Years  Completed    ZOSTER IMMUNIZATION  Completed    COVID-19 Vaccine  Completed    HPV IMMUNIZATION  Aged Out    MENINGITIS IMMUNIZATION  Aged Out    RSV MONOCLONAL ANTIBODY  Aged Out         Review of Systems  Constitutional, HEENT, cardiovascular, pulmonary, gi and gu systems are negative, except as otherwise noted.     Objective    Exam  /60 (BP Location: Left arm, Patient Position: Sitting, Cuff Size: Adult Regular)   Pulse 69   Temp 97.6  F (36.4  C) (Tympanic)   Ht 1.651 m (5' 5\")   Wt 78.9 kg (174 lb)   LMP  (LMP Unknown)   SpO2 98%   BMI 28.96 kg/m     Estimated body mass index is 28.96 kg/m  as calculated from the following:    Height as of this encounter: 1.651 m (5' 5\").    Weight as of this encounter: 78.9 kg (174 lb).    Physical Exam  GENERAL: alert and no distress  EYES: Eyes grossly normal to inspection, PERRL and conjunctivae and sclerae normal  HENT: ear canals and TM's normal, nose and " mouth without ulcers or lesions  NECK: no adenopathy, no asymmetry, masses, or scars  RESP: lungs clear to auscultation - no rales, rhonchi or wheezes  BREAST: Declines. Mammogram scheduled.   CV: regular rate and rhythm, normal S1 S2, no S3 or S4, no murmur, click or rub, no peripheral edema  ABDOMEN: soft, nontender, no hepatosplenomegaly, no masses and bowel sounds normal   (female): deferred  MS: no gross musculoskeletal defects noted, no edema  SKIN: no suspicious lesions or rashes  NEURO: Normal strength and tone, mentation intact and speech normal  PSYCH: mentation appears normal, affect normal/bright        2/21/2024   Mini Cog   Clock Draw Score 2 Normal   3 Item Recall 3 objects recalled   Mini Cog Total Score 5        Signed Electronically by: Tiny Issa NP

## 2024-05-15 ENCOUNTER — ANCILLARY PROCEDURE (OUTPATIENT)
Dept: MAMMOGRAPHY | Facility: OTHER | Age: 74
End: 2024-05-15
Attending: NURSE PRACTITIONER
Payer: MEDICARE

## 2024-05-15 ENCOUNTER — TELEPHONE (OUTPATIENT)
Dept: MAMMOGRAPHY | Facility: OTHER | Age: 74
End: 2024-05-15

## 2024-05-15 DIAGNOSIS — Z12.31 VISIT FOR SCREENING MAMMOGRAM: ICD-10-CM

## 2024-05-15 PROCEDURE — 77063 BREAST TOMOSYNTHESIS BI: CPT | Mod: TC

## 2024-07-01 DIAGNOSIS — K21.9 GASTROESOPHAGEAL REFLUX DISEASE, UNSPECIFIED WHETHER ESOPHAGITIS PRESENT: Primary | ICD-10-CM

## 2024-07-01 RX ORDER — PANTOPRAZOLE SODIUM 40 MG/1
TABLET, DELAYED RELEASE ORAL
Qty: 90 TABLET | Refills: 3 | Status: SHIPPED | OUTPATIENT
Start: 2024-07-01

## 2024-07-01 NOTE — TELEPHONE ENCOUNTER
Protonix 40 mg       Last Written Prescription Date:  2/21/24  Last Fill Quantity: 90,   # refills: 0  Last Office Visit: 2/21/24  Future Office visit:       Routing refill request to provider for review/approval because:  PPI Protocol Failed      Medication indicated for associated diagnosis    The medication is prescribed for one or more of the following conditions:                Erosive esophagitis               Gastritis              Gastric hypersecretion              Gastric ulcer              Gastroesophageal reflux disease              Helicobacter pylori gastrointestinal tract infection              Ulcer of duodenum              Drug-induced peptic ulcer              Esophageal stricture              Gastrointestinal hemorrhage              Indigestion              Stress ulcer              Zollinger-August syndrome              Johnson s esophagus              Laryngopharyngeal reflux

## 2025-02-17 DIAGNOSIS — Z12.31 VISIT FOR SCREENING MAMMOGRAM: Primary | ICD-10-CM

## 2025-03-27 SDOH — HEALTH STABILITY: PHYSICAL HEALTH: ON AVERAGE, HOW MANY DAYS PER WEEK DO YOU ENGAGE IN MODERATE TO STRENUOUS EXERCISE (LIKE A BRISK WALK)?: 4 DAYS

## 2025-03-27 SDOH — HEALTH STABILITY: PHYSICAL HEALTH: ON AVERAGE, HOW MANY MINUTES DO YOU ENGAGE IN EXERCISE AT THIS LEVEL?: 30 MIN

## 2025-03-27 ASSESSMENT — SOCIAL DETERMINANTS OF HEALTH (SDOH): HOW OFTEN DO YOU GET TOGETHER WITH FRIENDS OR RELATIVES?: ONCE A WEEK

## 2025-03-30 ENCOUNTER — HEALTH MAINTENANCE LETTER (OUTPATIENT)
Age: 75
End: 2025-03-30

## 2025-03-30 DIAGNOSIS — I10 ESSENTIAL HYPERTENSION: ICD-10-CM

## 2025-03-30 DIAGNOSIS — E78.5 HYPERLIPIDEMIA, UNSPECIFIED HYPERLIPIDEMIA TYPE: ICD-10-CM

## 2025-03-30 DIAGNOSIS — E03.9 HYPOTHYROIDISM, UNSPECIFIED TYPE: ICD-10-CM

## 2025-03-30 DIAGNOSIS — F51.01 PRIMARY INSOMNIA: ICD-10-CM

## 2025-03-31 RX ORDER — LEVOTHYROXINE SODIUM 112 UG/1
112 TABLET ORAL DAILY
Qty: 90 TABLET | Refills: 3 | Status: SHIPPED | OUTPATIENT
Start: 2025-03-31

## 2025-03-31 RX ORDER — ATENOLOL 25 MG/1
25 TABLET ORAL DAILY
Qty: 90 TABLET | Refills: 3 | Status: SHIPPED | OUTPATIENT
Start: 2025-03-31

## 2025-03-31 RX ORDER — TRAZODONE HYDROCHLORIDE 50 MG/1
50 TABLET ORAL AT BEDTIME
Qty: 90 TABLET | Refills: 0 | Status: SHIPPED | OUTPATIENT
Start: 2025-03-31 | End: 2025-04-01

## 2025-03-31 RX ORDER — ATORVASTATIN CALCIUM 20 MG/1
20 TABLET, FILM COATED ORAL DAILY
Qty: 90 TABLET | Refills: 3 | Status: SHIPPED | OUTPATIENT
Start: 2025-03-31

## 2025-03-31 NOTE — TELEPHONE ENCOUNTER
Levothyroxine (Synthroid/Levothroid) 112 MCG tablet     Last Written Prescription Date:  02/21/2024  Last Fill Quantity: 90,   # refills: 3  Last Office Visit: 02/21/2024  Future Office visit:    Next 5 appointments (look out 90 days)      Apr 01, 2025 8:30 AM  (Arrive by 8:15 AM)  Adult Preventative Visit with Tiny Issa NP  [facility] ([facility] ) [address] [place] [address]  [phone]             Routing refill request to provider for review/approval because:  Protocol failed on the Synthroid.     Atenolol (Tenormin) 25 MG tablet     Last Written Prescription Date:  02/21/2024  Last Fill Quantity: 90,   # refills: 3  Last Office Visit:   Future Office visit:    Next 5 appointments (look out 90 days)      Apr 01, 2025 8:30 AM  (Arrive by 8:15 AM)  Adult Preventative Visit with Tiny Issa NP  [facility] ([facility] ) [address] [facility] [address]  [phone]             Routing refill request to provider for review/approval because:  Protocol failed on the Tenormin.      Atorvastatin (Lipitor) 20 MG tablet    Last Written Prescription Date:  02/21/2024  Last Fill Quantity: 90,   # refills: 3  Last Office Visit:   Future Office visit:    Next 5 appointments (look out 90 days)      Apr 01, 2025 8:30 AM  (Arrive by 8:15 AM)  Adult Preventative Visit with Tiny Issa NP  [facility] ([facility] ) [address] [place] [address]  [phone]             Routing refill request to provider for review/approval because:  Protocol failed on the Liptor.      Amitriptyline (Elavil)  25 MG tablet   Last Written Prescription Date:  02/21/2024  Last Fill Quantity: 90,   # refills: 3  Last Office Visit:   Future Office visit:    Next 5 appointments (look out 90 days)      Apr 01, 2025 8:30 AM  (Arrive by 8:15 AM)  Adult Preventative Visit with Tiny Issa  NP  [facility] [place] ([facility] [place] ) [address]  [place] [address]  [phone]             Routing refill request to provider for review/approval because:       Trazodone (Desyrel) 50 MG tablet     Last Written Prescription Date:  02/21/2024  Last Fill Quantity: 90,   # refills: 3  Last Office Visit:   Future Office visit:    Next 5 appointments (look out 90 days)      Apr 01, 2025 8:30 AM  (Arrive by 8:15 AM)  Adult Preventative Visit with Tiny Issa NP  [facility] [place] ([facility] [place] ) [address]  [place] [address]  [phone]             Routing refill request to provider for review/approval because:

## 2025-03-31 NOTE — TELEPHONE ENCOUNTER
Antihyperlipidemic agents [id number]/30/2025 05:01 AM   Protocol Details LDL on file in the past 12 months       Beta-Blockers Protocol Failed    Rerun Protocol (3/30/2025 5:01 AM)    Most recent blood pressure under 140/90 in past 12 months        BP Readings from Last 3 Encounters:   02/21/24 120/60   04/27/23 123/61   01/30/23 124/80

## 2025-04-01 ENCOUNTER — OFFICE VISIT (OUTPATIENT)
Dept: FAMILY MEDICINE | Facility: OTHER | Age: 75
End: 2025-04-01
Attending: NURSE PRACTITIONER
Payer: MEDICARE

## 2025-04-01 VITALS
HEIGHT: 65 IN | WEIGHT: 178.3 LBS | OXYGEN SATURATION: 97 % | BODY MASS INDEX: 29.71 KG/M2 | SYSTOLIC BLOOD PRESSURE: 130 MMHG | HEART RATE: 63 BPM | RESPIRATION RATE: 16 BRPM | DIASTOLIC BLOOD PRESSURE: 84 MMHG | TEMPERATURE: 97.6 F

## 2025-04-01 DIAGNOSIS — Z00.00 HEALTH MAINTENANCE EXAMINATION: Primary | ICD-10-CM

## 2025-04-01 DIAGNOSIS — E03.9 HYPOTHYROIDISM, UNSPECIFIED TYPE: ICD-10-CM

## 2025-04-01 DIAGNOSIS — Z78.0 ASYMPTOMATIC POSTMENOPAUSAL ESTROGEN DEFICIENCY: ICD-10-CM

## 2025-04-01 DIAGNOSIS — Z13.0 SCREENING, ANEMIA, DEFICIENCY, IRON: ICD-10-CM

## 2025-04-01 DIAGNOSIS — R73.9 HYPERGLYCEMIA: ICD-10-CM

## 2025-04-01 DIAGNOSIS — F51.01 PRIMARY INSOMNIA: ICD-10-CM

## 2025-04-01 DIAGNOSIS — E78.5 HYPERLIPIDEMIA, UNSPECIFIED HYPERLIPIDEMIA TYPE: ICD-10-CM

## 2025-04-01 DIAGNOSIS — K21.9 GASTROESOPHAGEAL REFLUX DISEASE, UNSPECIFIED WHETHER ESOPHAGITIS PRESENT: ICD-10-CM

## 2025-04-01 DIAGNOSIS — F41.9 ANXIETY: ICD-10-CM

## 2025-04-01 DIAGNOSIS — I10 ESSENTIAL HYPERTENSION: ICD-10-CM

## 2025-04-01 LAB
ALBUMIN SERPL BCG-MCNC: 4.2 G/DL (ref 3.5–5.2)
ALP SERPL-CCNC: 70 U/L (ref 40–150)
ALT SERPL W P-5'-P-CCNC: 16 U/L (ref 0–50)
ANION GAP SERPL CALCULATED.3IONS-SCNC: 7 MMOL/L (ref 7–15)
AST SERPL W P-5'-P-CCNC: 20 U/L (ref 0–45)
BASOPHILS # BLD AUTO: 0 10E3/UL (ref 0–0.2)
BASOPHILS NFR BLD AUTO: 1 %
BILIRUB SERPL-MCNC: 0.5 MG/DL
BUN SERPL-MCNC: 12.6 MG/DL (ref 8–23)
CALCIUM SERPL-MCNC: 9.9 MG/DL (ref 8.8–10.4)
CHLORIDE SERPL-SCNC: 104 MMOL/L (ref 98–107)
CHOLEST SERPL-MCNC: 150 MG/DL
CREAT SERPL-MCNC: 0.83 MG/DL (ref 0.51–0.95)
EGFRCR SERPLBLD CKD-EPI 2021: 74 ML/MIN/1.73M2
EOSINOPHIL # BLD AUTO: 0.1 10E3/UL (ref 0–0.7)
EOSINOPHIL NFR BLD AUTO: 3 %
ERYTHROCYTE [DISTWIDTH] IN BLOOD BY AUTOMATED COUNT: 13.2 % (ref 10–15)
EST. AVERAGE GLUCOSE BLD GHB EST-MCNC: 123 MG/DL
FASTING STATUS PATIENT QL REPORTED: YES
FASTING STATUS PATIENT QL REPORTED: YES
GLUCOSE SERPL-MCNC: 104 MG/DL (ref 70–99)
HBA1C MFR BLD: 5.9 %
HCO3 SERPL-SCNC: 29 MMOL/L (ref 22–29)
HCT VFR BLD AUTO: 43.4 % (ref 35–47)
HDLC SERPL-MCNC: 64 MG/DL
HGB BLD-MCNC: 14.3 G/DL (ref 11.7–15.7)
IMM GRANULOCYTES # BLD: 0 10E3/UL
IMM GRANULOCYTES NFR BLD: 0 %
LDLC SERPL CALC-MCNC: 70 MG/DL
LYMPHOCYTES # BLD AUTO: 2 10E3/UL (ref 0.8–5.3)
LYMPHOCYTES NFR BLD AUTO: 36 %
MCH RBC QN AUTO: 29.1 PG (ref 26.5–33)
MCHC RBC AUTO-ENTMCNC: 32.9 G/DL (ref 31.5–36.5)
MCV RBC AUTO: 88 FL (ref 78–100)
MONOCYTES # BLD AUTO: 0.6 10E3/UL (ref 0–1.3)
MONOCYTES NFR BLD AUTO: 10 %
NEUTROPHILS # BLD AUTO: 2.9 10E3/UL (ref 1.6–8.3)
NEUTROPHILS NFR BLD AUTO: 51 %
NONHDLC SERPL-MCNC: 86 MG/DL
NRBC # BLD AUTO: 0 10E3/UL
NRBC BLD AUTO-RTO: 0 /100
PLATELET # BLD AUTO: 228 10E3/UL (ref 150–450)
POTASSIUM SERPL-SCNC: 4.2 MMOL/L (ref 3.4–5.3)
PROT SERPL-MCNC: 7.6 G/DL (ref 6.4–8.3)
RBC # BLD AUTO: 4.92 10E6/UL (ref 3.8–5.2)
SODIUM SERPL-SCNC: 140 MMOL/L (ref 135–145)
TRIGL SERPL-MCNC: 78 MG/DL
TSH SERPL DL<=0.005 MIU/L-ACNC: 2.56 UIU/ML (ref 0.3–4.2)
WBC # BLD AUTO: 5.7 10E3/UL (ref 4–11)

## 2025-04-01 PROCEDURE — 80053 COMPREHEN METABOLIC PANEL: CPT | Mod: ZL | Performed by: NURSE PRACTITIONER

## 2025-04-01 PROCEDURE — 83036 HEMOGLOBIN GLYCOSYLATED A1C: CPT | Mod: ZL | Performed by: NURSE PRACTITIONER

## 2025-04-01 PROCEDURE — 84443 ASSAY THYROID STIM HORMONE: CPT | Mod: ZL | Performed by: NURSE PRACTITIONER

## 2025-04-01 PROCEDURE — 36415 COLL VENOUS BLD VENIPUNCTURE: CPT | Mod: ZL | Performed by: NURSE PRACTITIONER

## 2025-04-01 PROCEDURE — 80061 LIPID PANEL: CPT | Mod: ZL | Performed by: NURSE PRACTITIONER

## 2025-04-01 PROCEDURE — 85004 AUTOMATED DIFF WBC COUNT: CPT | Mod: ZL | Performed by: NURSE PRACTITIONER

## 2025-04-01 PROCEDURE — G0463 HOSPITAL OUTPT CLINIC VISIT: HCPCS | Mod: 25

## 2025-04-01 PROCEDURE — G0463 HOSPITAL OUTPT CLINIC VISIT: HCPCS

## 2025-04-01 RX ORDER — TRAZODONE HYDROCHLORIDE 50 MG/1
50 TABLET ORAL AT BEDTIME
Qty: 90 TABLET | Refills: 3 | Status: SHIPPED | OUTPATIENT
Start: 2025-04-01

## 2025-04-01 ASSESSMENT — PAIN SCALES - GENERAL: PAINLEVEL_OUTOF10: NO PAIN (0)

## 2025-04-01 NOTE — PROGRESS NOTES
"Preventive Care Visit  [facility]  Tiny Issa NP, Family Medicine  Apr 1, 2025      Assessment & Plan     (Z00.00) Health maintenance examination  (primary encounter diagnosis)  Plan: Mammogram scheduled. Dexa-scan ordered. Vaccines UTD. Colonoscopy due in 9/2026. Labs done today.     (K21.9) Gastroesophageal reflux disease, unspecified whether esophagitis present  Comment: controlled  Plan: C/W current medications.    (I10) Essential hypertension  Plan: Well controlled. Continue current medications. Encouraged daily exercise and a low sodium diet. Recommended checking BP's 2x/wk, call the clinic if consistantly s>140 or d>90. Follow up in 12 months.     (E78.5) Hyperlipidemia, unspecified hyperlipidemia type  Comment: tolerating Lipitor  Plan: Lipids and CMP in process. Will notify patient of the results when available and intervene accordingly.     (E03.9) Hypothyroidism, unspecified type  Plan: TSH in process.    (F51.01) Primary insomnia  Comment: controlled  Plan: Continue current medications.     (F41.9) Anxiety  Comment: controlled  Plan: Will continue working with grief counselor.     (Z13.0) Screening, anemia, deficiency, iron  Plan: CBC in process.     (Z78.0) Asymptomatic postmenopausal estrogen deficiency  Plan: Dexa-scan ordered.     Patient has been advised of split billing requirements and indicates understanding: Yes        BMI  Estimated body mass index is 29.67 kg/m  as calculated from the following:    Height as of this encounter: 1.651 m (5' 5\").    Weight as of this encounter: 80.9 kg (178 lb 4.8 oz).       Counseling  Appropriate preventive services were addressed with this patient via screening, questionnaire, or discussion as appropriate for fall prevention, nutrition, physical activity, Tobacco-use cessation, social engagement, weight loss and cognition.  Checklist reviewing preventive services available has been given to the patient.  Reviewed patient's diet, addressing " concerns and/or questions.   She is at risk for psychosocial distress and has been provided with information to reduce risk.   Discussed possible causes of fatigue. The patient was provided with written information regarding signs of hearing loss.     The longitudinal plan of care for the diagnosis(es)/condition(s) as documented were addressed during this visit. Due to the added complexity in care, I will continue to support Aide in the subsequent management and with ongoing continuity of care.    Subjective   Aide is a 74 year old, presenting for the following:  Physical (Annual Wellness Visit), Lipids, Hypertension, Thyroid Problem (Hypothyroid), Anxiety, and Gastrophageal Reflux        4/1/2025     8:22 AM   Additional Questions   Roomed by Don Holliday LPN   Accompanied by Self         4/1/2025     8:22 AM   Patient Reported Additional Medications   Patient reports taking the following new medications None       HPI    Hyperlipidemia Follow-Up    Are you regularly taking any medication or supplement to lower your cholesterol?   Yes- Atorvastatin  Are you having muscle aches or other side effects that you think could be caused by your cholesterol lowering medication?  No  Denies chest pain, shortness of breath, dizziness, syncope, or palpitations.     Hypertension Follow-up    Do you check your blood pressure regularly outside of the clinic? No   Are you following a low salt diet? Yes  Are your blood pressures ever more than 140 on the top number (systolic) OR more   than 90 on the bottom number (diastolic), for example 140/90? N/A  Taking atenolol without side effects.     Anxiety   How are you doing with your anxiety since your last visit? Improved   Are you having other symptoms that might be associated with anxiety? Yes:  Loss of son  Have you had a significant life event? Grief or Loss   Are you feeling depressed? No times of overwhelming sadness  Do you have any concerns with your use of alcohol or  other drugs? No  No thoughts of self harm.   Working with grief recovery counselor.     Functional dyspepsia. Switched to protonix with minimal relief and then amitriptyline was added. Has complete improvement. Would like to continue both. Consumes a healthy diet and avoids trigger foods. No melena. No nausea or vomiting. No abdominal pain.     She tried to get off trazodone, but had significant insomnia, so she went back on it and has been doing well. Would like to continue.      Social History     Tobacco Use    Smoking status: Never    Smokeless tobacco: Never   Substance Use Topics    Alcohol use: Yes     Comment: daily- wine     Drug use: No         5/6/2019     7:00 AM   WOJCIECH-7 SCORE   Total Score 0         5/6/2019     7:00 AM 1/23/2023    10:18 AM   PHQ   PHQ-9 Total Score 0 5   Q9: Thoughts of better off dead/self-harm past 2 weeks Not at all Not at all         1/23/2023    10:18 AM   Last PHQ-9   1.  Little interest or pleasure in doing things 1   2.  Feeling down, depressed, or hopeless 1   3.  Trouble falling or staying asleep, or sleeping too much 1   4.  Feeling tired or having little energy 1   5.  Poor appetite or overeating 0   6.  Feeling bad about yourself 0   7.  Trouble concentrating 1   8.  Moving slowly or restless 0   Q9: Thoughts of better off dead/self-harm past 2 weeks 0   PHQ-9 Total Score 5         5/6/2019     7:00 AM   WOJCIECH-7    1. Feeling nervous, anxious, or on edge 0   2. Not being able to stop or control worrying 0   3. Worrying too much about different things 0   4. Trouble relaxing 0   5. Being so restless that it is hard to sit still 0   6. Becoming easily annoyed or irritable 0   7. Feeling afraid, as if something awful might happen 0   WOJCIECH-7 Total Score 0     Hypothyroidism Follow-up    Since last visit, patient describes the following symptoms: dry skin and fatigue    Advance Care Planning  Patient does not have a Health Care Directive: Patient states has Advance Directive and  will bring in a copy to clinic.      3/27/2025   General Health   How would you rate your overall physical health? Good   Feel stress (tense, anxious, or unable to sleep) To some extent   (!) STRESS CONCERN      3/27/2025   Nutrition   Diet: Regular (no restrictions)         3/27/2025   Exercise   Days per week of moderate/strenous exercise 4 days   Average minutes spent exercising at this level 30 min         3/27/2025   Social Factors   Frequency of gathering with friends or relatives Once a week   Worry food won't last until get money to buy more No   Food not last or not have enough money for food? No   Do you have housing? (Housing is defined as stable permanent housing and does not include staying ouside in a car, in a tent, in an abandoned building, in an overnight shelter, or couch-surfing.) Yes   Are you worried about losing your housing? No   Lack of transportation? No   Unable to get utilities (heat,electricity)? No         3/27/2025   Fall Risk   Fallen 2 or more times in the past year? No   Trouble with walking or balance? No          3/27/2025   Activities of Daily Living- Home Safety   Needs help with the following daily activites None of the above   Safety concerns in the home None of the above         3/27/2025   Dental   Dentist two times every year? Yes         3/27/2025   Hearing Screening   Hearing concerns? (!) TROUBLE UNDERSTANDING SPEECH ON THE TELEPHONE     She is not concerned with her hearing.         3/27/2025   Driving Risk Screening   Patient/family members have concerns about driving No         3/27/2025   General Alertness/Fatigue Screening   Have you been more tired than usual lately? (!) YES         3/27/2025   Urinary Incontinence Screening   Bothered by leaking urine in past 6 months No           2/16/2024   TB Screening   Were you born outside of the [place]? No           Today's PHQ-2 Score:       3/31/2025     9:35 AM   PHQ-2 ( 1999 [employer])   Q1: Little interest or pleasure in doing  things 0   Q2: Feeling down, depressed or hopeless 1   PHQ-2 Score 1    Q1: Little interest or pleasure in doing things Not at all   Q2: Feeling down, depressed or hopeless Several days   PHQ-2 Score 1       Patient-reported           3/27/2025   Substance Use   Alcohol more than 3/day or more than 7/wk No   Do you have a current opioid prescription? No   How severe/bad is pain from 1 to 10? 0/10 (No Pain)   Do you use any other substances recreationally? No     Social History     Tobacco Use    Smoking status: Never    Smokeless tobacco: Never   Substance Use Topics    Alcohol use: Yes     Comment: daily- wine     Drug use: No           5/15/2024   LAST FHS-7 RESULTS   1st degree relative breast or ovarian cancer No   Any relative bilateral breast cancer No   Any male have breast cancer No   Any ONE woman have BOTH breast AND ovarian cancer No   Any woman with breast cancer before 50yrs No   2 or more relatives with breast AND/OR ovarian cancer No   2 or more relatives with breast AND/OR bowel cancer No       Mammogram Screening - Mammogram every 1-2 years updated in Health Maintenance based on mutual decision making    Scheduled in 2025.     ASCVD Risk   The 10-year ASCVD risk score (Abdirahman HAMPTON, et al., 2019) is: 14.9%    Values used to calculate the score:      Age: 74 years      Sex: Female      Is Non- : No      Diabetic: No      Tobacco smoker: No      Systolic Blood Pressure: 130 mmHg      Is BP treated: Yes      HDL Cholesterol: 68 mg/dL      Total Cholesterol: 157 mg/dL    Fracture Risk Assessment Tool  Link to Frax Calculator  Use the information below to complete the Frax calculator  : 1950  Sex: female  Weight (kg): 80.9 kg (actual weight)  Height (cm): 165.1 cm  Previous Fragility Fracture:  No  History of parent with fractured hip:  Yes  Current Smoking:  No  Patient has been on glucocorticoids for more than 3 months (5mg/day or more): No  Rheumatoid Arthritis  on Problem List:  No  Secondary Osteoporosis on Problem List:  No  Consumes 3 or more units of alcohol per day: No  Femoral Neck BMD (g/cm2)        Reviewed and updated as needed this visit by Provider                    BP Readings from Last 3 Encounters:   04/01/25 130/84   02/21/24 120/60   04/27/23 123/61    Wt Readings from Last 3 Encounters:   04/01/25 80.9 kg (178 lb 4.8 oz)   02/21/24 78.9 kg (174 lb)   04/27/23 76.9 kg (169 lb 8.5 oz)                  Patient Active Problem List   Diagnosis    Encounter for general adult medical examination without abnormal findings    Essential hypertension    Gastroesophageal reflux disease    Hypothyroidism    Insomnia    Osteopenia    Postmenopausal status    H/O colonoscopy    [place] cardiac risk 6% in next 10 years    Tendonitis of knee, left    Hyperlipidemia, unspecified hyperlipidemia type     Past Surgical History:   Procedure Laterality Date    ARTHROSCOPY KNEE Left 02/24/2022    Procedure: Left Knee Arthroscopy, partial Lateral Menisectomy and chondroplasty;  Surgeon: Gokul Nava MD;  Location: [facility] OR    BIOPSY  2021    Skin biopsy    caraitd Left 2011    partial facial disection with malignant tumor- [facility]     COLONOSCOPY N/A 09/30/2016    Procedure: COLONOSCOPY;  Surgeon: Gaurav Santana DO;  Location: [facility] OR    COLONOSCOPY      normal- every 10 years- [facility]     ENT SURGERY  9/5/11    Parotid gland removed;facial dissection       Social History     Tobacco Use    Smoking status: Never    Smokeless tobacco: Never   Substance Use Topics    Alcohol use: Yes     Comment: daily- wine      Family History   Problem Relation Age of Onset    Hypertension Mother         Vascular dementia    Osteoporosis Mother     Obesity Mother     Parkinsonism Father     Hypertension Father     Thyroid Disease Father         Parkinson s    Hypothyroidism Sister     Hypothyroidism Sister     Hypothyroidism Sister     Hypothyroidism Sister      Osteoporosis Sister     Thyroid Disease Sister     Thyroid Disease Sister     Thyroid Disease Sister     Thyroid Disease Sister     Arrhythmia Son          Current Outpatient Medications   Medication Sig Dispense Refill    amitriptyline (ELAVIL) 25 MG tablet TAKE 1 TABLET (25 MG) BY MOUTH AT BEDTIME 90 tablet 0    atenolol (TENORMIN) 25 MG tablet TAKE 1 TABLET (25 MG) BY MOUTH DAILY 90 tablet 3    atorvastatin (LIPITOR) 20 MG tablet TAKE 1 TABLET (20 MG) BY MOUTH DAILY 90 tablet 3    calcium-vitamin D-vitamin K (VIACTIV) 500-500-40 MG-UNT-MCG CHEW Take 1 tablet by mouth daily      levothyroxine (SYNTHROID/LEVOTHROID) 112 MCG tablet TAKE 1 TABLET (112 MCG) BY MOUTH DAILY 90 tablet 3    MULTIPLE VITAMINS PO Take 1 tablet by mouth daily      pantoprazole (PROTONIX) 40 MG EC tablet TAKE 1 TABLET BY MOUTH EVERY MORNING BEFORE BREAKFAST. DO NOT CRUSH. 90 tablet 3    traZODone (DESYREL) 50 MG tablet TAKE 1 TABLET (50 MG) BY MOUTH AT BEDTIME 90 tablet 0     Current providers sharing in care for this patient include:  Patient Care Team:  Tiny Issa NP as PCP - General  Tiny sIsa NP as Assigned PCP    The following health maintenance items are reviewed in Epic and correct as of today:  Health Maintenance   Topic Date Due    HEPATITIS C SCREENING  Never done    MEDICARE ANNUAL WELLNESS VISIT  02/21/2025    BMP  02/21/2025    LIPID  02/21/2025    TSH W/FREE T4 REFLEX  02/21/2025    COVID-19 Vaccine (8 - 2024-25 season) 04/03/2025    FALL RISK ASSESSMENT  04/01/2026    MAMMO SCREENING  05/15/2026    COLORECTAL CANCER SCREENING  09/30/2026    DIABETES SCREENING  02/21/2027    RSV VACCINE (1 - 1-dose 75+ series) 07/19/2027    DTAP/TDAP/TD IMMUNIZATION (6 - Td or Tdap) 02/13/2028    ADVANCE CARE PLANNING  02/21/2029    DEXA  02/17/2038    PHQ-2 (once per calendar year)  Completed    INFLUENZA VACCINE  Completed    Pneumococcal Vaccine: 50+ Years  Completed    ZOSTER IMMUNIZATION  Completed    HPV IMMUNIZATION  Aged Out  "   MENINGITIS IMMUNIZATION  Aged Out         Review of Systems  CONSTITUTIONAL: NEGATIVE for fever, chills, change in weight  INTEGUMENTARY/SKIN: NEGATIVE for worrisome rashes, moles or lesions  EYES: NEGATIVE for vision changes or irritation  ENT/MOUTH: NEGATIVE for ear, mouth and throat problems  RESP: NEGATIVE for significant cough or SOB  BREAST: NEGATIVE for masses, tenderness or discharge  CV: NEGATIVE for chest pain, palpitations or peripheral edema  GI: NEGATIVE for nausea, abdominal pain, heartburn, or change in bowel habits  : NEGATIVE for frequency, dysuria, or hematuria  MUSCULOSKELETAL: NEGATIVE for significant arthralgias or myalgia  NEURO: NEGATIVE for weakness, dizziness or paresthesias  ENDOCRINE: NEGATIVE for temperature intolerance, skin/hair changes  HEME: NEGATIVE for bleeding problems  PSYCHIATRIC: NEGATIVE for changes in mood or affect     Objective    Exam  /84   Pulse 63   Temp 97.6  F (36.4  C) (Tympanic)   Resp 16   Ht 1.651 m (5' 5\")   Wt 80.9 kg (178 lb 4.8 oz)   LMP  (LMP Unknown)   SpO2 97%   BMI 29.67 kg/m     Estimated body mass index is 29.67 kg/m  as calculated from the following:    Height as of this encounter: 1.651 m (5' 5\").    Weight as of this encounter: 80.9 kg (178 lb 4.8 oz).    Physical Exam  GENERAL: alert and no distress  EYES: Eyes grossly normal to inspection, PERRL and conjunctivae and sclerae normal  HENT: ear canals and TM's normal, nose and mouth without ulcers or lesions  NECK: no adenopathy, no asymmetry, masses, or scars  RESP: lungs clear to auscultation - no rales, rhonchi or wheezes  BREAST: declines, mammogram scheduled  CV: regular rate and rhythm, faint murmur, no peripheral edema  ABDOMEN: soft, nontender, no hepatosplenomegaly, no masses and bowel sounds normal   (female): declines, no issues  MS: no gross musculoskeletal defects noted, no edema  NEURO: Normal strength and tone, mentation intact and speech normal  PSYCH: mentation " appears normal, affect normal/bright        4/1/2025   Mini Cog   Clock Draw Score 2 Normal   3 Item Recall 3 objects recalled   Mini Cog Total Score 5     Labs in process.     Signed Electronically by: Tiny Issa NP

## 2025-04-28 ENCOUNTER — HOSPITAL ENCOUNTER (OUTPATIENT)
Dept: BONE DENSITY | Facility: HOSPITAL | Age: 75
Discharge: HOME | End: 2025-04-28
Attending: NURSE PRACTITIONER | Admitting: RADIOLOGY
Payer: MEDICARE

## 2025-04-28 DIAGNOSIS — Z78.0 ASYMPTOMATIC POSTMENOPAUSAL ESTROGEN DEFICIENCY: ICD-10-CM

## 2025-04-28 PROCEDURE — 77080 DXA BONE DENSITY AXIAL: CPT | Mod: 26 | Performed by: RADIOLOGY

## 2025-04-28 PROCEDURE — 77080 DXA BONE DENSITY AXIAL: CPT

## 2025-05-19 ENCOUNTER — ANCILLARY PROCEDURE (OUTPATIENT)
Dept: MAMMOGRAPHY | Facility: OTHER | Age: 75
End: 2025-05-19
Attending: NURSE PRACTITIONER
Payer: MEDICARE

## 2025-05-19 DIAGNOSIS — Z12.31 VISIT FOR SCREENING MAMMOGRAM: ICD-10-CM

## 2025-05-19 PROCEDURE — 77067 SCR MAMMO BI INCL CAD: CPT | Mod: 26 | Performed by: RADIOLOGY

## 2025-05-19 PROCEDURE — 77063 BREAST TOMOSYNTHESIS BI: CPT | Mod: TC

## 2025-05-19 PROCEDURE — 77063 BREAST TOMOSYNTHESIS BI: CPT | Mod: 26 | Performed by: RADIOLOGY

## 2025-05-20 ENCOUNTER — RESULTS FOLLOW-UP (OUTPATIENT)
Dept: FAMILY MEDICINE | Facility: OTHER | Age: 75
End: 2025-05-20

## 2025-06-28 DIAGNOSIS — K21.9 GASTROESOPHAGEAL REFLUX DISEASE, UNSPECIFIED WHETHER ESOPHAGITIS PRESENT: ICD-10-CM

## 2025-06-30 RX ORDER — PANTOPRAZOLE SODIUM 40 MG/1
TABLET, DELAYED RELEASE ORAL
Qty: 90 TABLET | Refills: 3 | Status: SHIPPED | OUTPATIENT
Start: 2025-06-30

## 2025-06-30 NOTE — TELEPHONE ENCOUNTER
Pantoprazole ( Protonix)  40 MG EC tablet    Last Written Prescription Date:  07/01/2024  Last Fill Quantity: 90,   # refills: 3  Last Office Visit: 04/01/2025  Future Office visit:       Routing refill request to provider for review/approval because:

## 2025-07-14 NOTE — PROGRESS NOTES
Assessment & Plan     (R39.9) UTI symptoms  (primary encounter diagnosis)  (N39.0) Urinary tract infection without hematuria, site unspecified  Plan: Taking Macrobid, but still having symptoms. UA with RBCs and WBCs. Will stop the Macrobid and start Keflex, but also culture. Will notify patient of the results when available and intervene accordingly.     Encouraged to continue to push fluids.     (I10) Essential hypertension  Comment: blood pressure  high at 162/96  Plan: Has not been high in the past. Could be from not feeling well. Will have her return in 3 weeks for a nurse only BP check. Will notify patient of the results when available and intervene accordingly.       The longitudinal plan of care for the diagnosis(es)/condition(s) as documented were addressed during this visit. Due to the added complexity in care, I will continue to support Aide in the subsequent management and with ongoing continuity of care.    Subjective   Aide is a 74 year old, presenting for the following health issues:  UTI    History of Present Illness       Reason for visit:  UTI  Symptoms include:  Frequent urge to urinate. Burning during urination. Feeling like bladder isn t empty  Symptom intensity:  Moderate  Symptom progression:  Worsening  Had these symptoms before:  Yes  Has tried/received treatment for these symptoms:  Yes  Previous treatment was successful:  No   She is taking medications regularly.        Genitourinary - Female  Onset/Duration: 7/14/2025  Description:   Painful urination (Dysuria): YES           Frequency: YES  Blood in urine (Hematuria): YES  Delay in urine (Hesitency): YES  Intensity: moderate, severe  Progression of Symptoms:  worsening and constant  Accompanying Signs & Symptoms:  Fever/chills: No  Flank pain: No  Nausea and vomiting: No  Vaginal symptoms: none  Abdominal/Pelvic Pain: No  History:   History of frequent UTI s: No  History of kidney stones: No  Sexually Active: YES - no concern about  STDs  Possibility of pregnancy: N/A  Precipitating or alleviating factors: None  Therapies tried and outcome: Increase fluid intake  Recently had E-Visit for UTI symptoms. Was treated with Macrobid. UA was not done.       Review of Systems  Constitutional, HEENT, cardiovascular, pulmonary, gi and gu systems are negative, except as otherwise noted.      Objective    BP (!) 162/96 (BP Location: Right arm, Patient Position: Sitting, Cuff Size: Adult Regular)   Pulse 69   Temp 97.4  F (36.3  C) (Tympanic)   Resp 12   Wt 79 kg (174 lb 3.2 oz)   LMP  (LMP Unknown)   SpO2 98%   BMI 28.99 kg/m    Body mass index is 28.99 kg/m .  Physical Exam   GENERAL: alert and no distress  EYES: Eyes grossly normal to inspection, PERRL and conjunctivae and sclerae normal  HENT: ear canals and TM's normal, nose and mouth without ulcers or lesions  NECK: no adenopathy, no asymmetry, masses, or scars  RESP: lungs clear to auscultation - no rales, rhonchi or wheezes  CV: regular rate and rhythm, normal S1 S2, no S3 or S4, no murmur, click or rub, no peripheral edema  ABDOMEN: soft, nontender, no hepatosplenomegaly, no masses and bowel sounds normal  MS: no gross musculoskeletal defects noted, no edema  NEURO: Normal strength and tone, mentation intact and speech normal  BACK: no CVA tenderness, no paralumbar tenderness  PSYCH: mentation appears normal, affect normal/bright    Recent Results (from the past 24 hours)   UA with Microscopic reflex to Culture - [place]    Specimen: Urine, Midstream   Result Value Ref Range    Color Urine Yellow Colorless, Straw, Light Yellow, Yellow    Appearance Urine Clear Clear    Glucose Urine Negative Negative mg/dL    Bilirubin Urine Negative Negative    Ketones Urine Negative Negative mg/dL    Specific Gravity Urine 1.010 1.003 - 1.035    Blood Urine Negative Negative    pH Urine 7.0 4.7 - 8.0    Protein Albumin Urine Negative Negative mg/dL    Urobilinogen Urine Normal Normal mg/dL    Nitrite Urine  Negative Negative    Leukocyte Esterase Urine Moderate (A) Negative    RBC Urine 5 (H) <=2 /HPF    WBC Urine 6 (H) <=5 /HPF    Squamous Epithelials Urine 1 <=1 /HPF    Narrative    Urine Culture ordered based on laboratory criteria           Signed Electronically by: Tiny Issa NP

## 2025-07-15 ENCOUNTER — LAB (OUTPATIENT)
Dept: LAB | Facility: OTHER | Age: 75
End: 2025-07-15
Attending: NURSE PRACTITIONER
Payer: MEDICARE

## 2025-07-15 ENCOUNTER — OFFICE VISIT (OUTPATIENT)
Dept: FAMILY MEDICINE | Facility: OTHER | Age: 75
End: 2025-07-15
Attending: NURSE PRACTITIONER
Payer: MEDICARE

## 2025-07-15 VITALS
DIASTOLIC BLOOD PRESSURE: 96 MMHG | BODY MASS INDEX: 28.99 KG/M2 | WEIGHT: 174.2 LBS | SYSTOLIC BLOOD PRESSURE: 162 MMHG | RESPIRATION RATE: 12 BRPM | TEMPERATURE: 97.4 F | HEART RATE: 69 BPM | OXYGEN SATURATION: 98 %

## 2025-07-15 DIAGNOSIS — Z00.00 ENCOUNTER FOR GENERAL ADULT MEDICAL EXAMINATION WITHOUT ABNORMAL FINDINGS: Primary | ICD-10-CM

## 2025-07-15 DIAGNOSIS — I10 ESSENTIAL HYPERTENSION: ICD-10-CM

## 2025-07-15 DIAGNOSIS — R39.9 UTI SYMPTOMS: Primary | ICD-10-CM

## 2025-07-15 DIAGNOSIS — N39.0 URINARY TRACT INFECTION WITHOUT HEMATURIA, SITE UNSPECIFIED: ICD-10-CM

## 2025-07-15 LAB
ALBUMIN UR-MCNC: NEGATIVE MG/DL
APPEARANCE UR: CLEAR
BILIRUB UR QL STRIP: NEGATIVE
COLOR UR AUTO: YELLOW
GLUCOSE UR STRIP-MCNC: NEGATIVE MG/DL
HGB UR QL STRIP: NEGATIVE
HOLD SPECIMEN: NORMAL
KETONES UR STRIP-MCNC: NEGATIVE MG/DL
LEUKOCYTE ESTERASE UR QL STRIP: ABNORMAL
NITRATE UR QL: NEGATIVE
PH UR STRIP: 7 [PH] (ref 4.7–8)
RBC URINE: 5 /HPF
SP GR UR STRIP: 1.01 (ref 1–1.03)
SQUAMOUS EPITHELIAL: 1 /HPF
UROBILINOGEN UR STRIP-MCNC: NORMAL MG/DL
WBC URINE: 6 /HPF

## 2025-07-15 PROCEDURE — 87086 URINE CULTURE/COLONY COUNT: CPT | Mod: ZL | Performed by: NURSE PRACTITIONER

## 2025-07-15 PROCEDURE — 81001 URINALYSIS AUTO W/SCOPE: CPT | Mod: ZL | Performed by: NURSE PRACTITIONER

## 2025-07-15 PROCEDURE — G0463 HOSPITAL OUTPT CLINIC VISIT: HCPCS

## 2025-07-15 RX ORDER — CEPHALEXIN 500 MG/1
500 CAPSULE ORAL 2 TIMES DAILY
Qty: 14 CAPSULE | Refills: 0 | Status: SHIPPED | OUTPATIENT
Start: 2025-07-15 | End: 2025-07-22

## 2025-07-15 ASSESSMENT — PAIN SCALES - GENERAL: PAINLEVEL_OUTOF10: NO PAIN (0)

## 2025-07-16 LAB — BACTERIA UR CULT: NORMAL

## 2025-07-30 ENCOUNTER — ALLIED HEALTH/NURSE VISIT (OUTPATIENT)
Dept: FAMILY MEDICINE | Facility: OTHER | Age: 75
End: 2025-07-30
Attending: NURSE PRACTITIONER
Payer: MEDICARE

## 2025-07-30 VITALS — OXYGEN SATURATION: 96 % | SYSTOLIC BLOOD PRESSURE: 138 MMHG | DIASTOLIC BLOOD PRESSURE: 84 MMHG | HEART RATE: 70 BPM

## 2025-07-30 DIAGNOSIS — I10 ESSENTIAL HYPERTENSION: Primary | ICD-10-CM

## (undated) DEVICE — IRRIGATION-H2O 1000ML

## (undated) DEVICE — CAUTERY PAD-POLYHESIVE II ADULT

## (undated) DEVICE — HOLDER-KNEE ARTHROSCOPIC

## (undated) DEVICE — IRRIGATION-NACL 1000ML

## (undated) DEVICE — IRRIGATION-NACL 3000ML (BAG)

## (undated) DEVICE — BLADE-DISSECTOR AR-8400DS

## (undated) DEVICE — LIGHT HANDLE COVER FOR SKYTRON LIGHTS

## (undated) DEVICE — TUBING-ARTHROSCOPY-INFLOW

## (undated) DEVICE — PACK-KNEE ARTHROSCOPY-CUSTOM

## (undated) DEVICE — APPLICATOR-CHLORAPREP 26ML TINTED CHG 2%+ 70% IPA-SURGICAL

## (undated) DEVICE — BLADE-SCALPEL #11

## (undated) DEVICE — LABEL-STERILE PREPRINTED FOR OR

## (undated) DEVICE — GLV-8.0 PROTEXIS PI BLUE W/NEU-THERA LF/PF

## (undated) DEVICE — PACK-BASIN SET-UP

## (undated) DEVICE — NDL-BLUNT FILL 18G 1.5"

## (undated) DEVICE — BIN-ARTHROSCOPY CART

## (undated) DEVICE — CUFF-DISP STERILE 30IN SINGLE BLADDER

## (undated) RX ORDER — ONDANSETRON 2 MG/ML
INJECTION INTRAMUSCULAR; INTRAVENOUS
Status: DISPENSED
Start: 2022-02-24

## (undated) RX ORDER — KETOROLAC TROMETHAMINE 30 MG/ML
INJECTION, SOLUTION INTRAMUSCULAR; INTRAVENOUS
Status: DISPENSED
Start: 2022-02-24

## (undated) RX ORDER — PROPOFOL 10 MG/ML
INJECTION, EMULSION INTRAVENOUS
Status: DISPENSED
Start: 2022-02-24

## (undated) RX ORDER — FENTANYL CITRATE 50 UG/ML
INJECTION, SOLUTION INTRAMUSCULAR; INTRAVENOUS
Status: DISPENSED
Start: 2022-02-24

## (undated) RX ORDER — DEXAMETHASONE SODIUM PHOSPHATE 10 MG/ML
INJECTION, SOLUTION INTRAMUSCULAR; INTRAVENOUS
Status: DISPENSED
Start: 2022-02-24

## (undated) RX ORDER — EPHEDRINE SULFATE 50 MG/ML
INJECTION, SOLUTION INTRAVENOUS
Status: DISPENSED
Start: 2022-02-24

## (undated) RX ORDER — LIDOCAINE HYDROCHLORIDE 20 MG/ML
INJECTION, SOLUTION EPIDURAL; INFILTRATION; INTRACAUDAL; PERINEURAL
Status: DISPENSED
Start: 2022-02-24